# Patient Record
Sex: FEMALE | Race: WHITE | Employment: UNEMPLOYED | URBAN - NONMETROPOLITAN AREA
[De-identification: names, ages, dates, MRNs, and addresses within clinical notes are randomized per-mention and may not be internally consistent; named-entity substitution may affect disease eponyms.]

---

## 2017-02-15 DIAGNOSIS — M16.0 OSTEOARTHRITIS OF BOTH HIPS, UNSPECIFIED OSTEOARTHRITIS TYPE: ICD-10-CM

## 2017-02-16 RX ORDER — CELECOXIB 100 MG/1
100 CAPSULE ORAL 2 TIMES DAILY
Qty: 60 CAPSULE | Refills: 0 | Status: SHIPPED | OUTPATIENT
Start: 2017-02-16 | End: 2017-05-04 | Stop reason: SDUPTHER

## 2017-05-04 ENCOUNTER — TELEPHONE (OUTPATIENT)
Dept: OTHER | Age: 57
End: 2017-05-04

## 2017-05-04 ENCOUNTER — HOSPITAL ENCOUNTER (OUTPATIENT)
Dept: OTHER | Age: 57
Discharge: HOME | End: 2017-05-04
Attending: NURSE PRACTITIONER | Admitting: NURSE PRACTITIONER

## 2017-05-04 ENCOUNTER — OFFICE VISIT (OUTPATIENT)
Dept: OTHER | Age: 57
End: 2017-05-04

## 2017-05-04 VITALS
SYSTOLIC BLOOD PRESSURE: 130 MMHG | WEIGHT: 161 LBS | HEART RATE: 62 BPM | DIASTOLIC BLOOD PRESSURE: 70 MMHG | BODY MASS INDEX: 29.45 KG/M2 | OXYGEN SATURATION: 98 %

## 2017-05-04 DIAGNOSIS — M16.0 OSTEOARTHRITIS OF BOTH HIPS, UNSPECIFIED OSTEOARTHRITIS TYPE: ICD-10-CM

## 2017-05-04 DIAGNOSIS — F99 INSOMNIA DUE TO OTHER MENTAL DISORDER: ICD-10-CM

## 2017-05-04 DIAGNOSIS — F33.9 EPISODE OF RECURRENT MAJOR DEPRESSIVE DISORDER, UNSPECIFIED DEPRESSION EPISODE SEVERITY (HCC): ICD-10-CM

## 2017-05-04 DIAGNOSIS — I10 ESSENTIAL HYPERTENSION: Primary | ICD-10-CM

## 2017-05-04 DIAGNOSIS — F51.05 INSOMNIA DUE TO OTHER MENTAL DISORDER: ICD-10-CM

## 2017-05-04 DIAGNOSIS — M62.830 BACK MUSCLE SPASM: ICD-10-CM

## 2017-05-04 DIAGNOSIS — I10 ESSENTIAL HYPERTENSION: ICD-10-CM

## 2017-05-04 PROCEDURE — 99214 OFFICE O/P EST MOD 30 MIN: CPT | Performed by: NURSE PRACTITIONER

## 2017-05-04 RX ORDER — CARISOPRODOL 350 MG/1
TABLET ORAL
Refills: 4 | COMMUNITY
Start: 2017-04-14 | End: 2017-05-04 | Stop reason: SDUPTHER

## 2017-05-04 RX ORDER — QUETIAPINE FUMARATE 50 MG/1
50 TABLET, FILM COATED ORAL NIGHTLY
Qty: 30 TABLET | Refills: 3 | Status: SHIPPED | OUTPATIENT
Start: 2017-05-04 | End: 2017-08-14 | Stop reason: SDUPTHER

## 2017-05-04 RX ORDER — QUETIAPINE FUMARATE 50 MG/1
50 TABLET, FILM COATED ORAL NIGHTLY
Qty: 30 TABLET | Refills: 3 | Status: SHIPPED | OUTPATIENT
Start: 2017-05-04 | End: 2017-05-04 | Stop reason: SDUPTHER

## 2017-05-04 RX ORDER — CELECOXIB 100 MG/1
100 CAPSULE ORAL 2 TIMES DAILY
Qty: 60 CAPSULE | Refills: 3 | Status: SHIPPED | OUTPATIENT
Start: 2017-05-04 | End: 2017-05-04 | Stop reason: SDUPTHER

## 2017-05-04 RX ORDER — ESCITALOPRAM OXALATE 10 MG/1
10 TABLET ORAL DAILY
Qty: 30 TABLET | Refills: 3 | Status: SHIPPED | OUTPATIENT
Start: 2017-05-04 | End: 2017-08-14 | Stop reason: SDUPTHER

## 2017-05-04 RX ORDER — ESCITALOPRAM OXALATE 10 MG/1
10 TABLET ORAL DAILY
Qty: 30 TABLET | Refills: 3 | Status: SHIPPED | OUTPATIENT
Start: 2017-05-04 | End: 2017-05-04 | Stop reason: SDUPTHER

## 2017-05-04 RX ORDER — HYDROCHLOROTHIAZIDE 25 MG/1
25 TABLET ORAL DAILY
Qty: 30 TABLET | Refills: 3 | Status: SHIPPED | OUTPATIENT
Start: 2017-05-04 | End: 2017-05-04 | Stop reason: SDUPTHER

## 2017-05-04 RX ORDER — CARISOPRODOL 350 MG/1
350 TABLET ORAL 3 TIMES DAILY PRN
Qty: 90 TABLET | Refills: 1 | Status: SHIPPED | OUTPATIENT
Start: 2017-05-04 | End: 2017-06-15 | Stop reason: SDUPTHER

## 2017-05-04 RX ORDER — HYDROCHLOROTHIAZIDE 25 MG/1
25 TABLET ORAL DAILY
Qty: 30 TABLET | Refills: 3 | Status: SHIPPED | OUTPATIENT
Start: 2017-05-04 | End: 2017-08-14 | Stop reason: SDUPTHER

## 2017-05-04 RX ORDER — CELECOXIB 100 MG/1
100 CAPSULE ORAL 2 TIMES DAILY
Qty: 60 CAPSULE | Refills: 3 | Status: SHIPPED | OUTPATIENT
Start: 2017-05-04 | End: 2017-08-14 | Stop reason: SDUPTHER

## 2017-05-04 ASSESSMENT — ENCOUNTER SYMPTOMS
RESPIRATORY NEGATIVE: 1
ALLERGIC/IMMUNOLOGIC NEGATIVE: 1
BACK PAIN: 1
GASTROINTESTINAL NEGATIVE: 1
EYES NEGATIVE: 1

## 2017-06-15 ENCOUNTER — HOSPITAL ENCOUNTER (OUTPATIENT)
Dept: OTHER | Age: 57
Discharge: HOME | End: 2017-06-15
Attending: NURSE PRACTITIONER | Admitting: NURSE PRACTITIONER

## 2017-06-15 ENCOUNTER — OFFICE VISIT (OUTPATIENT)
Dept: OTHER | Age: 57
End: 2017-06-15

## 2017-06-15 ENCOUNTER — TELEPHONE (OUTPATIENT)
Dept: OTHER | Age: 57
End: 2017-06-15

## 2017-06-15 VITALS
OXYGEN SATURATION: 97 % | BODY MASS INDEX: 29.81 KG/M2 | DIASTOLIC BLOOD PRESSURE: 82 MMHG | SYSTOLIC BLOOD PRESSURE: 129 MMHG | WEIGHT: 163 LBS | HEART RATE: 75 BPM

## 2017-06-15 DIAGNOSIS — G56.20 ULNAR NEURITIS, UNSPECIFIED LATERALITY: ICD-10-CM

## 2017-06-15 DIAGNOSIS — M62.830 BACK MUSCLE SPASM: Primary | ICD-10-CM

## 2017-06-15 PROCEDURE — 99212 OFFICE O/P EST SF 10 MIN: CPT | Performed by: NURSE PRACTITIONER

## 2017-06-15 RX ORDER — CARISOPRODOL 350 MG/1
350 TABLET ORAL 3 TIMES DAILY PRN
Qty: 90 TABLET | Refills: 1 | Status: SHIPPED | OUTPATIENT
Start: 2017-06-15 | End: 2017-08-14 | Stop reason: SDUPTHER

## 2017-06-15 RX ORDER — PYRIDOXINE HCL (VITAMIN B6) 25 MG
25 TABLET ORAL DAILY
Qty: 30 TABLET | Refills: 0 | Status: SHIPPED | OUTPATIENT
Start: 2017-06-15 | End: 2017-06-15 | Stop reason: DRUGHIGH

## 2017-06-15 RX ORDER — PYRIDOXINE HCL (VITAMIN B6) 100 MG
100 TABLET ORAL DAILY
Qty: 30 TABLET | Refills: 0 | Status: SHIPPED | OUTPATIENT
Start: 2017-06-15 | End: 2017-08-14 | Stop reason: SDUPTHER

## 2017-06-15 ASSESSMENT — ENCOUNTER SYMPTOMS
GASTROINTESTINAL NEGATIVE: 1
EYES NEGATIVE: 1
BACK PAIN: 1
ALLERGIC/IMMUNOLOGIC NEGATIVE: 1
RESPIRATORY NEGATIVE: 1

## 2017-08-14 DIAGNOSIS — I10 ESSENTIAL HYPERTENSION: ICD-10-CM

## 2017-08-14 DIAGNOSIS — M16.0 OSTEOARTHRITIS OF BOTH HIPS, UNSPECIFIED OSTEOARTHRITIS TYPE: ICD-10-CM

## 2017-08-14 DIAGNOSIS — E78.5 HYPERLIPIDEMIA, UNSPECIFIED HYPERLIPIDEMIA TYPE: ICD-10-CM

## 2017-08-14 DIAGNOSIS — G56.20 ULNAR NEURITIS, UNSPECIFIED LATERALITY: ICD-10-CM

## 2017-08-14 DIAGNOSIS — M62.830 BACK MUSCLE SPASM: ICD-10-CM

## 2017-08-14 DIAGNOSIS — F33.9 EPISODE OF RECURRENT MAJOR DEPRESSIVE DISORDER, UNSPECIFIED DEPRESSION EPISODE SEVERITY (HCC): ICD-10-CM

## 2017-08-14 RX ORDER — SIMVASTATIN 20 MG
20 TABLET ORAL NIGHTLY
Qty: 30 TABLET | Refills: 3 | Status: SHIPPED | OUTPATIENT
Start: 2017-08-14 | End: 2018-01-11 | Stop reason: SDUPTHER

## 2017-08-14 RX ORDER — QUETIAPINE FUMARATE 50 MG/1
50 TABLET, FILM COATED ORAL NIGHTLY
Qty: 30 TABLET | Refills: 3 | Status: SHIPPED | OUTPATIENT
Start: 2017-08-14 | End: 2017-11-17 | Stop reason: SDUPTHER

## 2017-08-14 RX ORDER — ESCITALOPRAM OXALATE 10 MG/1
10 TABLET ORAL DAILY
Qty: 30 TABLET | Refills: 3 | Status: SHIPPED | OUTPATIENT
Start: 2017-08-14 | End: 2018-01-11 | Stop reason: SDUPTHER

## 2017-08-14 RX ORDER — CARISOPRODOL 350 MG/1
350 TABLET ORAL 3 TIMES DAILY PRN
Qty: 90 TABLET | Refills: 1 | Status: SHIPPED | OUTPATIENT
Start: 2017-08-14 | End: 2017-09-19 | Stop reason: SDUPTHER

## 2017-08-14 RX ORDER — HYDROCHLOROTHIAZIDE 25 MG/1
25 TABLET ORAL DAILY
Qty: 30 TABLET | Refills: 3 | Status: SHIPPED | OUTPATIENT
Start: 2017-08-14 | End: 2018-01-11 | Stop reason: SDUPTHER

## 2017-08-14 RX ORDER — PYRIDOXINE HCL (VITAMIN B6) 100 MG
100 TABLET ORAL DAILY
Qty: 30 TABLET | Refills: 0 | Status: SHIPPED | OUTPATIENT
Start: 2017-08-14 | End: 2018-05-14 | Stop reason: SDUPTHER

## 2017-08-14 RX ORDER — CELECOXIB 100 MG/1
100 CAPSULE ORAL 2 TIMES DAILY
Qty: 60 CAPSULE | Refills: 3 | Status: SHIPPED | OUTPATIENT
Start: 2017-08-14 | End: 2017-10-19 | Stop reason: SDUPTHER

## 2017-09-19 ENCOUNTER — OFFICE VISIT (OUTPATIENT)
Dept: INTERNAL MEDICINE CLINIC | Age: 57
End: 2017-09-19

## 2017-09-19 VITALS
OXYGEN SATURATION: 97 % | HEIGHT: 62 IN | BODY MASS INDEX: 29.44 KG/M2 | DIASTOLIC BLOOD PRESSURE: 70 MMHG | HEART RATE: 73 BPM | WEIGHT: 160 LBS | SYSTOLIC BLOOD PRESSURE: 110 MMHG

## 2017-09-19 DIAGNOSIS — Z23 NEEDS FLU SHOT: ICD-10-CM

## 2017-09-19 DIAGNOSIS — Z13.29 SCREENING FOR THYROID DISORDER: ICD-10-CM

## 2017-09-19 DIAGNOSIS — M62.830 BACK MUSCLE SPASM: ICD-10-CM

## 2017-09-19 DIAGNOSIS — E78.5 HYPERLIPIDEMIA, UNSPECIFIED HYPERLIPIDEMIA TYPE: ICD-10-CM

## 2017-09-19 DIAGNOSIS — G89.29 CHRONIC RIGHT SHOULDER PAIN: ICD-10-CM

## 2017-09-19 DIAGNOSIS — Z13.228 SCREENING FOR METABOLIC DISORDER: ICD-10-CM

## 2017-09-19 DIAGNOSIS — Z72.0 TOBACCO ABUSE: ICD-10-CM

## 2017-09-19 DIAGNOSIS — Z13.0 SCREENING, ANEMIA, DEFICIENCY, IRON: ICD-10-CM

## 2017-09-19 DIAGNOSIS — G56.20 ULNAR NEURITIS, UNSPECIFIED LATERALITY: ICD-10-CM

## 2017-09-19 DIAGNOSIS — R20.0 NUMBNESS OF FINGERS OF BOTH HANDS: Primary | ICD-10-CM

## 2017-09-19 DIAGNOSIS — M25.511 CHRONIC RIGHT SHOULDER PAIN: ICD-10-CM

## 2017-09-19 PROCEDURE — 90686 IIV4 VACC NO PRSV 0.5 ML IM: CPT | Performed by: NURSE PRACTITIONER

## 2017-09-19 PROCEDURE — 99214 OFFICE O/P EST MOD 30 MIN: CPT | Performed by: NURSE PRACTITIONER

## 2017-09-19 PROCEDURE — 90471 IMMUNIZATION ADMIN: CPT | Performed by: NURSE PRACTITIONER

## 2017-09-19 RX ORDER — CARISOPRODOL 350 MG/1
350 TABLET ORAL 3 TIMES DAILY PRN
Qty: 90 TABLET | Refills: 1 | Status: SHIPPED | OUTPATIENT
Start: 2017-09-19 | End: 2017-10-19 | Stop reason: SDUPTHER

## 2017-09-19 ASSESSMENT — ENCOUNTER SYMPTOMS
NAUSEA: 1
WHEEZING: 0
COUGH: 0
SHORTNESS OF BREATH: 0
RESPIRATORY NEGATIVE: 1
DIARRHEA: 0
BACK PAIN: 1
VOMITING: 0
RECTAL PAIN: 0

## 2017-10-19 ENCOUNTER — OFFICE VISIT (OUTPATIENT)
Dept: INTERNAL MEDICINE CLINIC | Age: 57
End: 2017-10-19

## 2017-10-19 VITALS
RESPIRATION RATE: 16 BRPM | OXYGEN SATURATION: 98 % | WEIGHT: 161 LBS | SYSTOLIC BLOOD PRESSURE: 104 MMHG | BODY MASS INDEX: 29.63 KG/M2 | HEART RATE: 78 BPM | HEIGHT: 62 IN | DIASTOLIC BLOOD PRESSURE: 72 MMHG

## 2017-10-19 DIAGNOSIS — R20.0 NUMBNESS OF FINGERS OF BOTH HANDS: Primary | ICD-10-CM

## 2017-10-19 DIAGNOSIS — M62.830 BACK MUSCLE SPASM: ICD-10-CM

## 2017-10-19 DIAGNOSIS — M16.0 OSTEOARTHRITIS OF BOTH HIPS, UNSPECIFIED OSTEOARTHRITIS TYPE: ICD-10-CM

## 2017-10-19 DIAGNOSIS — Z13.29 THYROID DISORDER SCREENING: ICD-10-CM

## 2017-10-19 DIAGNOSIS — Z13.220 SCREENING FOR LIPID DISORDERS: ICD-10-CM

## 2017-10-19 DIAGNOSIS — Z13.0 SCREENING, ANEMIA, DEFICIENCY, IRON: ICD-10-CM

## 2017-10-19 DIAGNOSIS — Z13.228 SCREENING FOR METABOLIC DISORDER: ICD-10-CM

## 2017-10-19 DIAGNOSIS — Z13.1 ENCOUNTER FOR SCREENING FOR DIABETES MELLITUS: ICD-10-CM

## 2017-10-19 LAB
A/G RATIO: 1.7 (ref 1.1–2.2)
ALBUMIN SERPL-MCNC: 4.7 G/DL (ref 3.4–5)
ALP BLD-CCNC: 86 U/L (ref 40–129)
ALT SERPL-CCNC: 11 U/L (ref 10–40)
ANION GAP SERPL CALCULATED.3IONS-SCNC: 14 MMOL/L (ref 3–16)
AST SERPL-CCNC: 16 U/L (ref 15–37)
BASOPHILS ABSOLUTE: 0.1 K/UL (ref 0–0.2)
BASOPHILS RELATIVE PERCENT: 1 %
BILIRUB SERPL-MCNC: 0.4 MG/DL (ref 0–1)
BUN BLDV-MCNC: 12 MG/DL (ref 7–20)
CALCIUM SERPL-MCNC: 10.4 MG/DL (ref 8.3–10.6)
CHLORIDE BLD-SCNC: 100 MMOL/L (ref 99–110)
CHOLESTEROL, TOTAL: 190 MG/DL (ref 0–199)
CO2: 27 MMOL/L (ref 21–32)
CREAT SERPL-MCNC: 0.6 MG/DL (ref 0.6–1.1)
EOSINOPHILS ABSOLUTE: 0.2 K/UL (ref 0–0.6)
EOSINOPHILS RELATIVE PERCENT: 2.2 %
GFR AFRICAN AMERICAN: >60
GFR NON-AFRICAN AMERICAN: >60
GLOBULIN: 2.8 G/DL
GLUCOSE BLD-MCNC: 89 MG/DL (ref 70–99)
HBA1C MFR BLD: 5 %
HCT VFR BLD CALC: 47.7 % (ref 36–48)
HDLC SERPL-MCNC: 43 MG/DL (ref 40–60)
HEMOGLOBIN: 16.1 G/DL (ref 12–16)
LDL CHOLESTEROL CALCULATED: 119 MG/DL
LYMPHOCYTES ABSOLUTE: 1.7 K/UL (ref 1–5.1)
LYMPHOCYTES RELATIVE PERCENT: 23.7 %
MCH RBC QN AUTO: 32.9 PG (ref 26–34)
MCHC RBC AUTO-ENTMCNC: 33.7 G/DL (ref 31–36)
MCV RBC AUTO: 97.6 FL (ref 80–100)
MONOCYTES ABSOLUTE: 0.5 K/UL (ref 0–1.3)
MONOCYTES RELATIVE PERCENT: 7 %
NEUTROPHILS ABSOLUTE: 4.8 K/UL (ref 1.7–7.7)
NEUTROPHILS RELATIVE PERCENT: 66.1 %
PDW BLD-RTO: 12.8 % (ref 12.4–15.4)
PLATELET # BLD: 205 K/UL (ref 135–450)
PMV BLD AUTO: 10.4 FL (ref 5–10.5)
POTASSIUM SERPL-SCNC: 5.3 MMOL/L (ref 3.5–5.1)
RBC # BLD: 4.88 M/UL (ref 4–5.2)
SODIUM BLD-SCNC: 141 MMOL/L (ref 136–145)
T4 FREE: 1.3 NG/DL (ref 0.9–1.8)
TOTAL PROTEIN: 7.5 G/DL (ref 6.4–8.2)
TRIGL SERPL-MCNC: 138 MG/DL (ref 0–150)
TSH SERPL DL<=0.05 MIU/L-ACNC: 0.46 UIU/ML (ref 0.27–4.2)
VLDLC SERPL CALC-MCNC: 28 MG/DL
WBC # BLD: 7.3 K/UL (ref 4–11)

## 2017-10-19 PROCEDURE — 83036 HEMOGLOBIN GLYCOSYLATED A1C: CPT | Performed by: NURSE PRACTITIONER

## 2017-10-19 PROCEDURE — 96127 BRIEF EMOTIONAL/BEHAV ASSMT: CPT | Performed by: NURSE PRACTITIONER

## 2017-10-19 PROCEDURE — 36415 COLL VENOUS BLD VENIPUNCTURE: CPT | Performed by: NURSE PRACTITIONER

## 2017-10-19 PROCEDURE — 99214 OFFICE O/P EST MOD 30 MIN: CPT | Performed by: NURSE PRACTITIONER

## 2017-10-19 RX ORDER — CARISOPRODOL 350 MG/1
350 TABLET ORAL 3 TIMES DAILY PRN
Qty: 90 TABLET | Refills: 1 | Status: SHIPPED | OUTPATIENT
Start: 2017-10-19 | End: 2017-12-12 | Stop reason: SDUPTHER

## 2017-10-19 RX ORDER — CELECOXIB 100 MG/1
100 CAPSULE ORAL 2 TIMES DAILY
Qty: 60 CAPSULE | Refills: 3 | Status: SHIPPED | OUTPATIENT
Start: 2017-10-19 | End: 2018-01-11 | Stop reason: SDUPTHER

## 2017-10-19 ASSESSMENT — PATIENT HEALTH QUESTIONNAIRE - PHQ9
8. MOVING OR SPEAKING SO SLOWLY THAT OTHER PEOPLE COULD HAVE NOTICED. OR THE OPPOSITE, BEING SO FIGETY OR RESTLESS THAT YOU HAVE BEEN MOVING AROUND A LOT MORE THAN USUAL: 0
3. TROUBLE FALLING OR STAYING ASLEEP: 1
9. THOUGHTS THAT YOU WOULD BE BETTER OFF DEAD, OR OF HURTING YOURSELF: 0
2. FEELING DOWN, DEPRESSED OR HOPELESS: 3
1. LITTLE INTEREST OR PLEASURE IN DOING THINGS: 1
4. FEELING TIRED OR HAVING LITTLE ENERGY: 0
SUM OF ALL RESPONSES TO PHQ QUESTIONS 1-9: 6
SUM OF ALL RESPONSES TO PHQ9 QUESTIONS 1 & 2: 4
6. FEELING BAD ABOUT YOURSELF - OR THAT YOU ARE A FAILURE OR HAVE LET YOURSELF OR YOUR FAMILY DOWN: 0
7. TROUBLE CONCENTRATING ON THINGS, SUCH AS READING THE NEWSPAPER OR WATCHING TELEVISION: 0
10. IF YOU CHECKED OFF ANY PROBLEMS, HOW DIFFICULT HAVE THESE PROBLEMS MADE IT FOR YOU TO DO YOUR WORK, TAKE CARE OF THINGS AT HOME, OR GET ALONG WITH OTHER PEOPLE: 1
5. POOR APPETITE OR OVEREATING: 1

## 2017-10-19 ASSESSMENT — ENCOUNTER SYMPTOMS: BACK PAIN: 1

## 2017-10-19 NOTE — PROGRESS NOTES
Darling Mckinnon is a 65 y.o. female who presents today with   Chief Complaint   Patient presents with    Numbness     Numbness of fingers and hands    Arm Pain     Pain in Right arm    Back Pain    Arthritis       /72 (Site: Left Arm, Position: Sitting, Cuff Size: Small Adult)   Pulse 78   Resp 16   Ht 5' 2\" (1.575 m)   Wt 161 lb (73 kg)   SpO2 98%   BMI 29.45 kg/m²      SUBJECTIVE:    HPI     Elizabeth Liu is a pleasant 65year old female who presents today for the reasons noted above in the Chief Complaint. She endorses worsening numbness in her hands and right arm, R>L. She also endorses continued back muscle spasms, back pain and insomnia. She denies any other somatic complaints.     Past Medical History:   Diagnosis Date    Allergic rhinitis     flowers    Anxiety     Arthritis     left hip    Chronic back pain     Depression     Essential hypertension 7/8/2016    Fall     \"fell coming in the door at home on 12/11/2015- went to ER- put brace on and sent me home then 12/13/2015 fell again- rehurt my left knee- for surgery\"( 12/17/2015\")    Hyperlipidemia 10/28/2016    Osteoarthritis     Restless legs syndrome     Sciatic pain     Tobacco abuse      Past Surgical History:   Procedure Laterality Date    EYE SURGERY Left     At age 15\"for lazy eye\"    FRACTURE SURGERY  bilat wrist fx as a child    \"fell off a swing set- broke both wrists\"    PATELLA SURGERY Left 12/17/15    ORIF patella     TUBAL LIGATION  1985     Family History   Problem Relation Age of Onset    Cancer Mother      lung ca    High Blood Pressure Mother     Stroke Father     COPD Father     Heart Disease Father     Kidney Disease Brother     Diabetes Brother     Glaucoma Brother     Diabetes Brother     High Blood Pressure Sister     Migraines Daughter     Diabetes Brother     Mental Retardation Brother      Social History   Substance Use Topics    Smoking status: Current Every Day Smoker     Packs/day: 0.50 Results   Component Value Date    WBC 8.6 06/03/2016    HGB 16.1 06/03/2016    HCT 47.3 06/03/2016    MCV 90.0 06/03/2016     06/03/2016    SEGSABS 5.9 06/03/2016    LYMPHSABS 1.8 06/03/2016    MONOSABS 0.6 06/03/2016    EOSABS 0.2 06/03/2016    BASOSABS 0.1 06/03/2016     Lab Results   Component Value Date    TSHHS 0.833 06/03/2016       Controlled Substances Monitoring:  Done 9/19/2017    ASSESSMENT AND PLAN:     1. Numbness of fingers of both hands  · Consider cervical vertebrae defect, musculoskeletal strain, ulnar entrapment  · [facility]  · XR Cervical Spine 2 or 3 VW; Future    2. Back muscle spasm  · Controlled  · Continue carisoprodol (SOMA) 350 MG tablet; Take 1 tablet by mouth 3 times daily as needed for Muscle spasms  Dispense: 90 tablet; Refill: 1    3. Osteoarthritis of both hips, unspecified osteoarthritis type  · Controlled  · Continue celecoxib (CELEBREX) 100 MG capsule; Take 1 capsule by mouth 2 times daily  Dispense: 60 capsule; Refill: 3    4. Screening, anemia, deficiency, iron  · CBC Auto Differential    5. Thyroid disorder screening  · TSH without Reflex  · T4, Free    6. Encounter for screening for diabetes mellitus  · POCT glycosylated hemoglobin (Hb A1C)    7. Screening for lipid disorders  · Lipid Panel    8.  Screening for metabolic disorder  · Comprehensive Metabolic Panel    Follow up: in 1 month for hand numbness, review lab results

## 2017-11-17 ENCOUNTER — OFFICE VISIT (OUTPATIENT)
Dept: INTERNAL MEDICINE CLINIC | Age: 57
End: 2017-11-17

## 2017-11-17 VITALS
RESPIRATION RATE: 14 BRPM | HEIGHT: 62 IN | HEART RATE: 62 BPM | WEIGHT: 163 LBS | SYSTOLIC BLOOD PRESSURE: 125 MMHG | BODY MASS INDEX: 30 KG/M2 | DIASTOLIC BLOOD PRESSURE: 86 MMHG | OXYGEN SATURATION: 98 %

## 2017-11-17 DIAGNOSIS — R20.0 NUMBNESS OF FINGERS OF BOTH HANDS: ICD-10-CM

## 2017-11-17 DIAGNOSIS — I10 ESSENTIAL HYPERTENSION: Primary | ICD-10-CM

## 2017-11-17 DIAGNOSIS — J30.89 CHRONIC NONSEASONAL ALLERGIC RHINITIS DUE TO OTHER ALLERGEN: ICD-10-CM

## 2017-11-17 DIAGNOSIS — Z72.0 TOBACCO ABUSE: ICD-10-CM

## 2017-11-17 DIAGNOSIS — F33.9 EPISODE OF RECURRENT MAJOR DEPRESSIVE DISORDER, UNSPECIFIED DEPRESSION EPISODE SEVERITY (HCC): ICD-10-CM

## 2017-11-17 DIAGNOSIS — E78.5 HYPERLIPIDEMIA, UNSPECIFIED HYPERLIPIDEMIA TYPE: ICD-10-CM

## 2017-11-17 PROBLEM — J30.9 ALLERGIC RHINITIS DUE TO ALLERGEN: Status: ACTIVE | Noted: 2017-11-17

## 2017-11-17 PROCEDURE — G8419 CALC BMI OUT NRM PARAM NOF/U: HCPCS | Performed by: NURSE PRACTITIONER

## 2017-11-17 PROCEDURE — G8427 DOCREV CUR MEDS BY ELIG CLIN: HCPCS | Performed by: NURSE PRACTITIONER

## 2017-11-17 PROCEDURE — G8484 FLU IMMUNIZE NO ADMIN: HCPCS | Performed by: NURSE PRACTITIONER

## 2017-11-17 PROCEDURE — 3014F SCREEN MAMMO DOC REV: CPT | Performed by: NURSE PRACTITIONER

## 2017-11-17 PROCEDURE — 4004F PT TOBACCO SCREEN RCVD TLK: CPT | Performed by: NURSE PRACTITIONER

## 2017-11-17 PROCEDURE — 99214 OFFICE O/P EST MOD 30 MIN: CPT | Performed by: NURSE PRACTITIONER

## 2017-11-17 PROCEDURE — 3017F COLORECTAL CA SCREEN DOC REV: CPT | Performed by: NURSE PRACTITIONER

## 2017-11-17 RX ORDER — QUETIAPINE FUMARATE 100 MG/1
100 TABLET, FILM COATED ORAL NIGHTLY
Qty: 30 TABLET | Refills: 1 | Status: SHIPPED | OUTPATIENT
Start: 2017-11-17 | End: 2018-01-11 | Stop reason: SDUPTHER

## 2017-11-17 RX ORDER — AZELASTINE 1 MG/ML
2 SPRAY, METERED NASAL 2 TIMES DAILY
Qty: 1 BOTTLE | Refills: 3 | Status: SHIPPED | OUTPATIENT
Start: 2017-11-17 | End: 2018-09-04 | Stop reason: SDUPTHER

## 2017-11-17 ASSESSMENT — ENCOUNTER SYMPTOMS
RHINORRHEA: 1
BACK PAIN: 1
NAUSEA: 0
SHORTNESS OF BREATH: 0
WHEEZING: 0
VOMITING: 0
DIARRHEA: 0
COUGH: 1
SORE THROAT: 0

## 2017-11-17 NOTE — PROGRESS NOTES
Neurological: Positive for numbness and headaches. Negative for dizziness, weakness and light-headedness. Numbness in both 5th fingers, intense itching above right 5th fingernail    Isolated headache   Psychiatric/Behavioral: Positive for sleep disturbance. Negative for dysphoric mood and suicidal ideas. The patient is nervous/anxious. Anxiety 7-8/10  Depression 1/10       OBJECTIVE:      Physical Exam   Constitutional: She is oriented to person, place, and time. Vital signs are normal. She appears well-developed and well-nourished. Lab and pertinent imaging results reviewed   HENT:   Head: Normocephalic. Cardiovascular: Normal rate, regular rhythm, S1 normal, S2 normal and normal heart sounds. No extrasystoles are present. Exam reveals no gallop, no S3, no S4 and no friction rub. No murmur heard. Pulses:       Radial pulses are 2+ on the right side, and 2+ on the left side. Pulmonary/Chest: Effort normal and breath sounds normal.   Abdominal: Soft. Musculoskeletal: Normal range of motion. Neurological: She is alert and oriented to person, place, and time. She has normal strength. No cranial nerve deficit or sensory deficit. She displays a negative Romberg sign. Coordination and gait normal. GCS eye subscore is 4. GCS verbal subscore is 5. GCS motor subscore is 6. Reflex Scores:       Tricep reflexes are 2+ on the right side and 2+ on the left side. Bicep reflexes are 2+ on the right side and 2+ on the left side. Brachioradialis reflexes are 2+ on the right side and 2+ on the left side. Patellar reflexes are 2+ on the right side and 2+ on the left side. Achilles reflexes are 2+ on the right side and 2+ on the left side. Skin: Skin is warm, dry and intact. Psychiatric: She has a normal mood and affect. Her speech is normal and behavior is normal.   Vitals reviewed. Results for Hai Fragoso (MRN [id number]) as of 1/20/2018 12:14   Ref.  Range 7/26/2016 Dispense: 30 tablet; Refill: 1  · Continue escitalopram    6. Chronic nonseasonal allergic rhinitis due to other allergen  · Controlled  · Continue azelastine (ASTELIN) 0.1 % nasal spray; 2 sprays by Nasal route 2 times daily Use in each nostril as directed  Dispense: 1 Bottle;  Refill: 3    Follow up: in 1 month

## 2017-12-12 DIAGNOSIS — M62.830 BACK MUSCLE SPASM: ICD-10-CM

## 2017-12-12 RX ORDER — CARISOPRODOL 350 MG/1
350 TABLET ORAL 3 TIMES DAILY PRN
Qty: 90 TABLET | Refills: 1 | Status: SHIPPED | OUTPATIENT
Start: 2017-12-12 | End: 2018-01-11 | Stop reason: SDUPTHER

## 2017-12-12 NOTE — TELEPHONE ENCOUNTER
Patient had and appt today but she was unable to make the appt due to the weather. She does need this refill. Please advise.

## 2018-01-11 ENCOUNTER — OFFICE VISIT (OUTPATIENT)
Dept: INTERNAL MEDICINE CLINIC | Age: 58
End: 2018-01-11

## 2018-01-11 VITALS
BODY MASS INDEX: 30.73 KG/M2 | RESPIRATION RATE: 14 BRPM | WEIGHT: 167 LBS | SYSTOLIC BLOOD PRESSURE: 120 MMHG | OXYGEN SATURATION: 98 % | DIASTOLIC BLOOD PRESSURE: 80 MMHG | HEIGHT: 62 IN | HEART RATE: 72 BPM

## 2018-01-11 DIAGNOSIS — M62.830 BACK MUSCLE SPASM: ICD-10-CM

## 2018-01-11 DIAGNOSIS — Z72.0 TOBACCO ABUSE: ICD-10-CM

## 2018-01-11 DIAGNOSIS — Z12.11 SCREENING FOR COLON CANCER: ICD-10-CM

## 2018-01-11 DIAGNOSIS — E78.5 HYPERLIPIDEMIA, UNSPECIFIED HYPERLIPIDEMIA TYPE: ICD-10-CM

## 2018-01-11 DIAGNOSIS — M16.0 OSTEOARTHRITIS OF BOTH HIPS, UNSPECIFIED OSTEOARTHRITIS TYPE: ICD-10-CM

## 2018-01-11 DIAGNOSIS — I10 ESSENTIAL HYPERTENSION: Primary | ICD-10-CM

## 2018-01-11 DIAGNOSIS — F33.9 EPISODE OF RECURRENT MAJOR DEPRESSIVE DISORDER, UNSPECIFIED DEPRESSION EPISODE SEVERITY (HCC): ICD-10-CM

## 2018-01-11 PROBLEM — G25.81 RESTLESS LEGS SYNDROME: Status: ACTIVE | Noted: 2018-01-11

## 2018-01-11 PROBLEM — M19.90 ARTHRITIS: Status: RESOLVED | Noted: 2018-01-11 | Resolved: 2018-01-11
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
## 2018-01-11 PROCEDURE — 99214 OFFICE O/P EST MOD 30 MIN: CPT | Performed by: NURSE PRACTITIONER

## 2018-01-11 PROCEDURE — G8417 CALC BMI ABV UP PARAM F/U: HCPCS | Performed by: NURSE PRACTITIONER

## 2018-01-11 PROCEDURE — 3014F SCREEN MAMMO DOC REV: CPT | Performed by: NURSE PRACTITIONER

## 2018-01-11 PROCEDURE — G8427 DOCREV CUR MEDS BY ELIG CLIN: HCPCS | Performed by: NURSE PRACTITIONER

## 2018-01-11 PROCEDURE — 3017F COLORECTAL CA SCREEN DOC REV: CPT | Performed by: NURSE PRACTITIONER

## 2018-01-11 PROCEDURE — G8484 FLU IMMUNIZE NO ADMIN: HCPCS | Performed by: NURSE PRACTITIONER

## 2018-01-11 PROCEDURE — 4004F PT TOBACCO SCREEN RCVD TLK: CPT | Performed by: NURSE PRACTITIONER

## 2018-01-11 RX ORDER — CELECOXIB 100 MG/1
100 CAPSULE ORAL 2 TIMES DAILY
Qty: 60 CAPSULE | Refills: 3 | Status: SHIPPED | OUTPATIENT
Start: 2018-01-11 | End: 2018-04-13 | Stop reason: SDUPTHER

## 2018-01-11 RX ORDER — SIMVASTATIN 20 MG
20 TABLET ORAL NIGHTLY
Qty: 30 TABLET | Refills: 3 | Status: SHIPPED | OUTPATIENT
Start: 2018-01-11 | End: 2018-04-13 | Stop reason: SDUPTHER

## 2018-01-11 RX ORDER — HYDROCHLOROTHIAZIDE 25 MG/1
25 TABLET ORAL DAILY
Qty: 30 TABLET | Refills: 3 | Status: SHIPPED | OUTPATIENT
Start: 2018-01-11 | End: 2018-04-13 | Stop reason: SDUPTHER

## 2018-01-11 RX ORDER — QUETIAPINE FUMARATE 100 MG/1
100 TABLET, FILM COATED ORAL NIGHTLY
Qty: 30 TABLET | Refills: 3 | Status: SHIPPED | OUTPATIENT
Start: 2018-01-11 | End: 2018-04-13 | Stop reason: SDUPTHER

## 2018-01-11 RX ORDER — CARISOPRODOL 350 MG/1
350 TABLET ORAL 3 TIMES DAILY PRN
Qty: 90 TABLET | Refills: 1 | Status: SHIPPED | OUTPATIENT
Start: 2018-01-11 | End: 2018-02-21 | Stop reason: SDUPTHER

## 2018-01-11 RX ORDER — ESCITALOPRAM OXALATE 10 MG/1
10 TABLET ORAL DAILY
Qty: 30 TABLET | Refills: 3 | Status: SHIPPED | OUTPATIENT
Start: 2018-01-11 | End: 2018-04-13 | Stop reason: SDUPTHER

## 2018-02-20 DIAGNOSIS — M62.830 BACK MUSCLE SPASM: ICD-10-CM

## 2018-02-20 RX ORDER — CARISOPRODOL 350 MG/1
350 TABLET ORAL 3 TIMES DAILY PRN
Qty: 90 TABLET | Refills: 1 | Status: CANCELLED | OUTPATIENT
Start: 2018-02-20 | End: 2018-03-22

## 2018-02-21 ENCOUNTER — TELEPHONE (OUTPATIENT)
Dept: INTERNAL MEDICINE CLINIC | Age: 58
End: 2018-02-21

## 2018-02-21 DIAGNOSIS — M62.830 BACK MUSCLE SPASM: ICD-10-CM

## 2018-02-21 RX ORDER — CARISOPRODOL 350 MG/1
350 TABLET ORAL 3 TIMES DAILY PRN
Qty: 90 TABLET | Refills: 0 | Status: SHIPPED | OUTPATIENT
Start: 2018-02-21 | End: 2018-02-22

## 2018-02-22 RX ORDER — CARISOPRODOL 350 MG/1
350 TABLET ORAL 3 TIMES DAILY PRN
Qty: 90 TABLET | Refills: 0 | Status: SHIPPED | OUTPATIENT
Start: 2018-02-22 | End: 2018-03-14 | Stop reason: SDUPTHER

## 2018-02-22 NOTE — TELEPHONE ENCOUNTER
[facility] but [facility] be sent to [facility].

## 2018-02-22 NOTE — TELEPHONE ENCOUNTER
Sent to wrong location, will resend to [facility] to be sent to [facility].

## 2018-03-14 ENCOUNTER — OFFICE VISIT (OUTPATIENT)
Dept: INTERNAL MEDICINE CLINIC | Age: 58
End: 2018-03-14

## 2018-03-14 VITALS
DIASTOLIC BLOOD PRESSURE: 80 MMHG | RESPIRATION RATE: 16 BRPM | SYSTOLIC BLOOD PRESSURE: 110 MMHG | HEART RATE: 82 BPM | HEIGHT: 62 IN | WEIGHT: 171 LBS | BODY MASS INDEX: 31.47 KG/M2 | OXYGEN SATURATION: 98 %

## 2018-03-14 DIAGNOSIS — Z13.31 POSITIVE DEPRESSION SCREENING: Primary | ICD-10-CM

## 2018-03-14 DIAGNOSIS — Z23 NEED FOR SHINGLES VACCINE: ICD-10-CM

## 2018-03-14 DIAGNOSIS — M62.830 BACK MUSCLE SPASM: ICD-10-CM

## 2018-03-14 DIAGNOSIS — Z12.11 COLON CANCER SCREENING: ICD-10-CM

## 2018-03-14 DIAGNOSIS — F33.1 MODERATE EPISODE OF RECURRENT MAJOR DEPRESSIVE DISORDER (HCC): ICD-10-CM

## 2018-03-14 PROCEDURE — G8427 DOCREV CUR MEDS BY ELIG CLIN: HCPCS | Performed by: NURSE PRACTITIONER

## 2018-03-14 PROCEDURE — G8417 CALC BMI ABV UP PARAM F/U: HCPCS | Performed by: NURSE PRACTITIONER

## 2018-03-14 PROCEDURE — G8482 FLU IMMUNIZE ORDER/ADMIN: HCPCS | Performed by: NURSE PRACTITIONER

## 2018-03-14 PROCEDURE — 4004F PT TOBACCO SCREEN RCVD TLK: CPT | Performed by: NURSE PRACTITIONER

## 2018-03-14 PROCEDURE — 99214 OFFICE O/P EST MOD 30 MIN: CPT | Performed by: NURSE PRACTITIONER

## 2018-03-14 PROCEDURE — 3017F COLORECTAL CA SCREEN DOC REV: CPT | Performed by: NURSE PRACTITIONER

## 2018-03-14 PROCEDURE — 3014F SCREEN MAMMO DOC REV: CPT | Performed by: NURSE PRACTITIONER

## 2018-03-14 PROCEDURE — G8431 POS CLIN DEPRES SCRN F/U DOC: HCPCS | Performed by: NURSE PRACTITIONER

## 2018-03-14 RX ORDER — CARISOPRODOL 350 MG/1
350 TABLET ORAL 3 TIMES DAILY PRN
Qty: 90 TABLET | Refills: 0 | Status: SHIPPED | OUTPATIENT
Start: 2018-03-14 | End: 2018-09-04 | Stop reason: SDUPTHER

## 2018-03-14 NOTE — PROGRESS NOTES
depression screening  · Positive Screen for Clinical Depression with a Documented Follow-up Plan     2. Moderate episode of recurrent major depressive disorder (HCC)  · Continue Lexapro    3. Back muscle spasm  · Continue carisoprodol (SOMA) 350 MG tablet; Take 1 tablet by mouth 3 times daily as needed for Muscle spasms for up to 30 days. Dispense: 90 tablet; Refill: 0    4. Need for shingles vaccine  · zoster vaccine live, PF, (ZOSTAVAX) 57835 UNT/0.65ML injection; Inject 0.65 mLs into the skin once for 1 dose  Dispense: 0.65 mL; Refill: 0    5. Colon cancer screening  · POCT Fecal Immunochemical Test (FIT); Future    Follow-up: In one month    On the basis of positive PHQ-9 screening ( ), the following plan was implemented: medication prescribed: Lexapro- 10 mg- patient will call for any significant medication side effects or worsening symptoms of depression. Patient will follow-up in 1 month(s) with PCP.

## 2018-04-13 ENCOUNTER — OFFICE VISIT (OUTPATIENT)
Dept: INTERNAL MEDICINE CLINIC | Age: 58
End: 2018-04-13

## 2018-04-13 VITALS
BODY MASS INDEX: 31.83 KG/M2 | HEART RATE: 97 BPM | WEIGHT: 173 LBS | HEIGHT: 62 IN | SYSTOLIC BLOOD PRESSURE: 110 MMHG | RESPIRATION RATE: 20 BRPM | OXYGEN SATURATION: 98 % | DIASTOLIC BLOOD PRESSURE: 82 MMHG

## 2018-04-13 DIAGNOSIS — M54.42 CHRONIC BILATERAL LOW BACK PAIN WITH BILATERAL SCIATICA: ICD-10-CM

## 2018-04-13 DIAGNOSIS — Z13.820 ENCOUNTER FOR SCREENING FOR OSTEOPOROSIS: ICD-10-CM

## 2018-04-13 DIAGNOSIS — M54.32 BILATERAL SCIATICA: ICD-10-CM

## 2018-04-13 DIAGNOSIS — I10 ESSENTIAL HYPERTENSION: ICD-10-CM

## 2018-04-13 DIAGNOSIS — F33.9 EPISODE OF RECURRENT MAJOR DEPRESSIVE DISORDER, UNSPECIFIED DEPRESSION EPISODE SEVERITY (HCC): ICD-10-CM

## 2018-04-13 DIAGNOSIS — E78.5 HYPERLIPIDEMIA, UNSPECIFIED HYPERLIPIDEMIA TYPE: ICD-10-CM

## 2018-04-13 DIAGNOSIS — G89.29 CHRONIC BILATERAL LOW BACK PAIN WITH BILATERAL SCIATICA: ICD-10-CM

## 2018-04-13 DIAGNOSIS — M54.41 CHRONIC BILATERAL LOW BACK PAIN WITH BILATERAL SCIATICA: ICD-10-CM

## 2018-04-13 DIAGNOSIS — M54.31 BILATERAL SCIATICA: ICD-10-CM

## 2018-04-13 DIAGNOSIS — M25.559 CHRONIC HIP PAIN, UNSPECIFIED LATERALITY: ICD-10-CM

## 2018-04-13 DIAGNOSIS — M16.0 OSTEOARTHRITIS OF BOTH HIPS, UNSPECIFIED OSTEOARTHRITIS TYPE: ICD-10-CM

## 2018-04-13 DIAGNOSIS — Z72.0 TOBACCO ABUSE: Primary | ICD-10-CM

## 2018-04-13 DIAGNOSIS — G89.29 CHRONIC HIP PAIN, UNSPECIFIED LATERALITY: ICD-10-CM

## 2018-04-13 PROCEDURE — 3014F SCREEN MAMMO DOC REV: CPT | Performed by: NURSE PRACTITIONER

## 2018-04-13 PROCEDURE — G8427 DOCREV CUR MEDS BY ELIG CLIN: HCPCS | Performed by: NURSE PRACTITIONER

## 2018-04-13 PROCEDURE — 3017F COLORECTAL CA SCREEN DOC REV: CPT | Performed by: NURSE PRACTITIONER

## 2018-04-13 PROCEDURE — 99215 OFFICE O/P EST HI 40 MIN: CPT | Performed by: NURSE PRACTITIONER

## 2018-04-13 PROCEDURE — G8417 CALC BMI ABV UP PARAM F/U: HCPCS | Performed by: NURSE PRACTITIONER

## 2018-04-13 PROCEDURE — 4004F PT TOBACCO SCREEN RCVD TLK: CPT | Performed by: NURSE PRACTITIONER

## 2018-04-13 RX ORDER — CELECOXIB 100 MG/1
100 CAPSULE ORAL 2 TIMES DAILY
Qty: 60 CAPSULE | Refills: 3 | Status: SHIPPED | OUTPATIENT
Start: 2018-04-13 | End: 2018-05-14 | Stop reason: SDUPTHER

## 2018-04-13 RX ORDER — HYDROCODONE BITARTRATE AND ACETAMINOPHEN 5; 325 MG/1; MG/1
1 TABLET ORAL EVERY 8 HOURS PRN
Qty: 21 TABLET | Refills: 0 | Status: SHIPPED | OUTPATIENT
Start: 2018-04-13 | End: 2018-04-13 | Stop reason: SDUPTHER

## 2018-04-13 RX ORDER — VARENICLINE TARTRATE 1 MG/1
1 TABLET, FILM COATED ORAL 2 TIMES DAILY
Qty: 60 TABLET | Refills: 1 | Status: SHIPPED | OUTPATIENT
Start: 2018-04-13 | End: 2018-04-13 | Stop reason: SDUPTHER

## 2018-04-13 RX ORDER — QUETIAPINE FUMARATE 100 MG/1
100 TABLET, FILM COATED ORAL NIGHTLY
Qty: 30 TABLET | Refills: 3 | Status: SHIPPED | OUTPATIENT
Start: 2018-04-13 | End: 2018-04-13 | Stop reason: SDUPTHER

## 2018-04-13 RX ORDER — CELECOXIB 100 MG/1
100 CAPSULE ORAL 2 TIMES DAILY
Qty: 60 CAPSULE | Refills: 3 | Status: SHIPPED | OUTPATIENT
Start: 2018-04-13 | End: 2018-04-13 | Stop reason: SDUPTHER

## 2018-04-13 RX ORDER — VARENICLINE TARTRATE 25 MG
KIT ORAL
Qty: 1 EACH | Refills: 0 | Status: SHIPPED | OUTPATIENT
Start: 2018-04-13 | End: 2018-08-03 | Stop reason: ALTCHOICE

## 2018-04-13 RX ORDER — ESCITALOPRAM OXALATE 10 MG/1
10 TABLET ORAL DAILY
Qty: 30 TABLET | Refills: 3 | Status: SHIPPED | OUTPATIENT
Start: 2018-04-13 | End: 2018-04-13 | Stop reason: SDUPTHER

## 2018-04-13 RX ORDER — VARENICLINE TARTRATE 25 MG
KIT ORAL
Qty: 1 EACH | Refills: 0 | Status: SHIPPED | OUTPATIENT
Start: 2018-04-13 | End: 2018-04-13 | Stop reason: SDUPTHER

## 2018-04-13 RX ORDER — SIMVASTATIN 20 MG
20 TABLET ORAL NIGHTLY
Qty: 30 TABLET | Refills: 3 | Status: SHIPPED | OUTPATIENT
Start: 2018-04-13 | End: 2018-05-14 | Stop reason: SDUPTHER

## 2018-04-13 RX ORDER — PREDNISONE 10 MG/1
TABLET ORAL
Qty: 39 TABLET | Refills: 0 | Status: SHIPPED | OUTPATIENT
Start: 2018-04-13 | End: 2018-04-13 | Stop reason: SDUPTHER

## 2018-04-13 RX ORDER — VARENICLINE TARTRATE 1 MG/1
1 TABLET, FILM COATED ORAL 2 TIMES DAILY
Qty: 60 TABLET | Refills: 1 | Status: SHIPPED | OUTPATIENT
Start: 2018-04-13 | End: 2018-08-03 | Stop reason: ALTCHOICE

## 2018-04-13 RX ORDER — HYDROCHLOROTHIAZIDE 25 MG/1
25 TABLET ORAL DAILY
Qty: 30 TABLET | Refills: 3 | Status: SHIPPED | OUTPATIENT
Start: 2018-04-13 | End: 2018-04-13 | Stop reason: SDUPTHER

## 2018-04-13 RX ORDER — SIMVASTATIN 20 MG
20 TABLET ORAL NIGHTLY
Qty: 30 TABLET | Refills: 3 | Status: SHIPPED | OUTPATIENT
Start: 2018-04-13 | End: 2018-04-13 | Stop reason: SDUPTHER

## 2018-04-13 RX ORDER — HYDROCHLOROTHIAZIDE 25 MG/1
25 TABLET ORAL DAILY
Qty: 30 TABLET | Refills: 3 | Status: SHIPPED | OUTPATIENT
Start: 2018-04-13 | End: 2018-05-14 | Stop reason: SDUPTHER

## 2018-04-13 RX ORDER — PREDNISONE 10 MG/1
TABLET ORAL
Qty: 39 TABLET | Refills: 0 | Status: SHIPPED | OUTPATIENT
Start: 2018-04-13 | End: 2018-08-03 | Stop reason: ALTCHOICE

## 2018-04-13 RX ORDER — CARISOPRODOL 350 MG/1
350 TABLET ORAL 3 TIMES DAILY PRN
Qty: 90 TABLET | Refills: 0 | Status: CANCELLED | OUTPATIENT
Start: 2018-04-13 | End: 2018-05-13

## 2018-04-13 RX ORDER — HYDROCODONE BITARTRATE AND ACETAMINOPHEN 5; 325 MG/1; MG/1
1 TABLET ORAL EVERY 8 HOURS PRN
Qty: 21 TABLET | Refills: 0 | Status: SHIPPED | OUTPATIENT
Start: 2018-04-13 | End: 2018-04-20

## 2018-04-13 RX ORDER — QUETIAPINE FUMARATE 100 MG/1
100 TABLET, FILM COATED ORAL NIGHTLY
Qty: 30 TABLET | Refills: 3 | Status: SHIPPED | OUTPATIENT
Start: 2018-04-13 | End: 2018-05-14 | Stop reason: SDUPTHER

## 2018-04-13 RX ORDER — AZELASTINE 1 MG/ML
2 SPRAY, METERED NASAL 2 TIMES DAILY
Qty: 1 BOTTLE | Refills: 3 | Status: CANCELLED | OUTPATIENT
Start: 2018-04-13

## 2018-04-13 RX ORDER — ESCITALOPRAM OXALATE 10 MG/1
10 TABLET ORAL DAILY
Qty: 30 TABLET | Refills: 3 | Status: SHIPPED | OUTPATIENT
Start: 2018-04-13 | End: 2018-05-14 | Stop reason: SDUPTHER

## 2018-04-13 RX ORDER — PYRIDOXINE HCL (VITAMIN B6) 100 MG
100 TABLET ORAL DAILY
Qty: 30 TABLET | Refills: 0 | Status: CANCELLED | OUTPATIENT
Start: 2018-04-13

## 2018-04-16 ENCOUNTER — HOSPITAL ENCOUNTER (OUTPATIENT)
Dept: GENERAL RADIOLOGY | Age: 58
Discharge: HOME | End: 2018-04-16
Attending: NURSE PRACTITIONER | Admitting: NURSE PRACTITIONER

## 2018-04-16 DIAGNOSIS — M25.551 PAIN IN JOINT INVOLVING RIGHT PELVIC REGION AND THIGH: ICD-10-CM

## 2018-04-16 DIAGNOSIS — M25.552 PAIN IN JOINT INVOLVING LEFT PELVIC REGION AND THIGH: ICD-10-CM

## 2018-04-16 DIAGNOSIS — M54.42 ACUTE BACK PAIN WITH SCIATICA, LEFT: ICD-10-CM

## 2018-04-25 ENCOUNTER — HOSPITAL ENCOUNTER (OUTPATIENT)
Dept: MAMMOGRAPHY | Age: 58
Discharge: HOME | End: 2018-04-25
Attending: NURSE PRACTITIONER | Admitting: NURSE PRACTITIONER

## 2018-04-25 DIAGNOSIS — Z13.820 ENCOUNTER FOR IMAGING TO ASSESS OSTEOPOROSIS: ICD-10-CM

## 2018-04-25 DIAGNOSIS — Z13.820 ENCOUNTER FOR SCREENING FOR OSTEOPOROSIS: ICD-10-CM

## 2018-05-14 ENCOUNTER — OFFICE VISIT (OUTPATIENT)
Dept: INTERNAL MEDICINE CLINIC | Age: 58
End: 2018-05-14

## 2018-05-14 VITALS
WEIGHT: 172.6 LBS | SYSTOLIC BLOOD PRESSURE: 141 MMHG | OXYGEN SATURATION: 98 % | HEIGHT: 62 IN | BODY MASS INDEX: 31.76 KG/M2 | RESPIRATION RATE: 20 BRPM | DIASTOLIC BLOOD PRESSURE: 90 MMHG | HEART RATE: 63 BPM

## 2018-05-14 DIAGNOSIS — I10 ESSENTIAL HYPERTENSION: ICD-10-CM

## 2018-05-14 DIAGNOSIS — F33.9 EPISODE OF RECURRENT MAJOR DEPRESSIVE DISORDER, UNSPECIFIED DEPRESSION EPISODE SEVERITY (HCC): ICD-10-CM

## 2018-05-14 DIAGNOSIS — M62.830 BACK MUSCLE SPASM: ICD-10-CM

## 2018-05-14 DIAGNOSIS — E78.5 HYPERLIPIDEMIA, UNSPECIFIED HYPERLIPIDEMIA TYPE: ICD-10-CM

## 2018-05-14 DIAGNOSIS — G56.20 ULNAR NEURITIS, UNSPECIFIED LATERALITY: ICD-10-CM

## 2018-05-14 DIAGNOSIS — Z72.0 TOBACCO ABUSE: Primary | ICD-10-CM

## 2018-05-14 DIAGNOSIS — M16.0 OSTEOARTHRITIS OF BOTH HIPS, UNSPECIFIED OSTEOARTHRITIS TYPE: ICD-10-CM

## 2018-05-14 DIAGNOSIS — M25.551 BILATERAL HIP PAIN: ICD-10-CM

## 2018-05-14 DIAGNOSIS — M25.552 BILATERAL HIP PAIN: ICD-10-CM

## 2018-05-14 PROBLEM — G89.29 CHRONIC RIGHT SHOULDER PAIN: Status: RESOLVED | Noted: 2017-09-19 | Resolved: 2018-05-14

## 2018-05-14 PROBLEM — M25.511 CHRONIC RIGHT SHOULDER PAIN: Status: RESOLVED | Noted: 2017-09-19 | Resolved: 2018-05-14

## 2018-05-14 PROBLEM — R20.0 NUMBNESS OF FINGERS OF BOTH HANDS: Status: RESOLVED | Noted: 2017-09-19 | Resolved: 2018-05-14

## 2018-05-14 PROCEDURE — 4004F PT TOBACCO SCREEN RCVD TLK: CPT | Performed by: NURSE PRACTITIONER

## 2018-05-14 PROCEDURE — 3017F COLORECTAL CA SCREEN DOC REV: CPT | Performed by: NURSE PRACTITIONER

## 2018-05-14 PROCEDURE — G8427 DOCREV CUR MEDS BY ELIG CLIN: HCPCS | Performed by: NURSE PRACTITIONER

## 2018-05-14 PROCEDURE — 3014F SCREEN MAMMO DOC REV: CPT | Performed by: NURSE PRACTITIONER

## 2018-05-14 PROCEDURE — G8417 CALC BMI ABV UP PARAM F/U: HCPCS | Performed by: NURSE PRACTITIONER

## 2018-05-14 PROCEDURE — 99214 OFFICE O/P EST MOD 30 MIN: CPT | Performed by: NURSE PRACTITIONER

## 2018-05-14 RX ORDER — HYDROCHLOROTHIAZIDE 25 MG/1
25 TABLET ORAL DAILY
Qty: 30 TABLET | Refills: 3 | Status: SHIPPED | OUTPATIENT
Start: 2018-05-14 | End: 2018-08-03 | Stop reason: SDUPTHER

## 2018-05-14 RX ORDER — ESCITALOPRAM OXALATE 10 MG/1
10 TABLET ORAL DAILY
Qty: 30 TABLET | Refills: 3 | Status: SHIPPED | OUTPATIENT
Start: 2018-05-14 | End: 2018-08-03 | Stop reason: SDUPTHER

## 2018-05-14 RX ORDER — PYRIDOXINE HCL (VITAMIN B6) 100 MG
100 TABLET ORAL DAILY
Qty: 30 TABLET | Refills: 0 | Status: SHIPPED | OUTPATIENT
Start: 2018-05-14 | End: 2018-08-03 | Stop reason: ALTCHOICE

## 2018-05-14 RX ORDER — CARISOPRODOL 350 MG/1
350 TABLET ORAL 3 TIMES DAILY PRN
Qty: 90 TABLET | Refills: 0 | Status: CANCELLED | OUTPATIENT
Start: 2018-05-14 | End: 2018-06-13

## 2018-05-14 RX ORDER — CARISOPRODOL 350 MG/1
350 TABLET ORAL 4 TIMES DAILY PRN
COMMUNITY
End: 2018-07-03 | Stop reason: SDUPTHER

## 2018-05-14 RX ORDER — QUETIAPINE FUMARATE 100 MG/1
100 TABLET, FILM COATED ORAL NIGHTLY
Qty: 30 TABLET | Refills: 3 | Status: SHIPPED | OUTPATIENT
Start: 2018-05-14 | End: 2018-08-03 | Stop reason: SDUPTHER

## 2018-05-14 RX ORDER — SIMVASTATIN 20 MG
20 TABLET ORAL NIGHTLY
Qty: 30 TABLET | Refills: 3 | Status: SHIPPED | OUTPATIENT
Start: 2018-05-14 | End: 2018-08-03 | Stop reason: SDUPTHER

## 2018-05-14 RX ORDER — CELECOXIB 100 MG/1
100 CAPSULE ORAL 2 TIMES DAILY
Qty: 60 CAPSULE | Refills: 3 | Status: SHIPPED | OUTPATIENT
Start: 2018-05-14 | End: 2018-08-03 | Stop reason: SDUPTHER

## 2018-05-15 LAB
AMPHETAMINE SCREEN, URINE: ABNORMAL
BARBITURATE SCREEN URINE: ABNORMAL
BENZODIAZEPINE SCREEN, URINE: ABNORMAL
CANNABINOID SCREEN URINE: POSITIVE
COCAINE METABOLITE SCREEN URINE: ABNORMAL
Lab: ABNORMAL
METHADONE SCREEN, URINE: ABNORMAL
OPIATE SCREEN URINE: ABNORMAL
OXYCODONE URINE: ABNORMAL
PH UA: 5
PHENCYCLIDINE SCREEN URINE: ABNORMAL
PROPOXYPHENE SCREEN: ABNORMAL

## 2018-05-16 RX ORDER — CARISOPRODOL 350 MG/1
350 TABLET ORAL 4 TIMES DAILY PRN
Qty: 120 TABLET | Refills: 0 | Status: CANCELLED | OUTPATIENT
Start: 2018-05-16 | End: 2018-06-15

## 2018-06-28 ENCOUNTER — HOSPITAL ENCOUNTER (OUTPATIENT)
Dept: LAB | Age: 58
Discharge: HOME | End: 2018-06-28
Attending: NURSE PRACTITIONER | Admitting: NURSE PRACTITIONER

## 2018-06-28 LAB
ANION GAP SERPL CALCULATED.3IONS-SCNC: 13 MMOL/L (ref 4–16)
BUN BLDV-MCNC: 9 MG/DL (ref 6–23)
CALCIUM SERPL-MCNC: 9.9 MG/DL (ref 8.3–10.6)
CHLORIDE BLD-SCNC: 100 MMOL/L (ref 99–110)
CO2: 27 MMOL/L (ref 21–32)
CREAT SERPL-MCNC: 0.8 MG/DL (ref 0.6–1.1)
GFR AFRICAN AMERICAN: >60 ML/MIN/1.73M2
GFR NON-AFRICAN AMERICAN: >60 ML/MIN/1.73M2
GLUCOSE FASTING: 82 MG/DL (ref 70–99)
POTASSIUM SERPL-SCNC: 4.2 MMOL/L (ref 3.5–5.1)
SODIUM BLD-SCNC: 140 MMOL/L (ref 135–145)

## 2018-07-03 ENCOUNTER — OFFICE VISIT (OUTPATIENT)
Dept: INTERNAL MEDICINE CLINIC | Age: 58
End: 2018-07-03

## 2018-07-03 VITALS
DIASTOLIC BLOOD PRESSURE: 76 MMHG | HEIGHT: 64 IN | SYSTOLIC BLOOD PRESSURE: 128 MMHG | OXYGEN SATURATION: 99 % | BODY MASS INDEX: 29.71 KG/M2 | WEIGHT: 174 LBS | HEART RATE: 68 BPM

## 2018-07-03 DIAGNOSIS — G25.81 RESTLESS LEG SYNDROME: ICD-10-CM

## 2018-07-03 DIAGNOSIS — Z12.4 SCREENING FOR CERVICAL CANCER: ICD-10-CM

## 2018-07-03 DIAGNOSIS — I10 ESSENTIAL HYPERTENSION: Primary | ICD-10-CM

## 2018-07-03 DIAGNOSIS — G89.29 CHRONIC BILATERAL LOW BACK PAIN WITH LEFT-SIDED SCIATICA: ICD-10-CM

## 2018-07-03 DIAGNOSIS — M62.830 BACK MUSCLE SPASM: ICD-10-CM

## 2018-07-03 DIAGNOSIS — E78.5 HYPERLIPIDEMIA, UNSPECIFIED HYPERLIPIDEMIA TYPE: ICD-10-CM

## 2018-07-03 DIAGNOSIS — M54.42 CHRONIC BILATERAL LOW BACK PAIN WITH LEFT-SIDED SCIATICA: ICD-10-CM

## 2018-07-03 PROCEDURE — G8417 CALC BMI ABV UP PARAM F/U: HCPCS | Performed by: NURSE PRACTITIONER

## 2018-07-03 PROCEDURE — 3014F SCREEN MAMMO DOC REV: CPT | Performed by: NURSE PRACTITIONER

## 2018-07-03 PROCEDURE — 3017F COLORECTAL CA SCREEN DOC REV: CPT | Performed by: NURSE PRACTITIONER

## 2018-07-03 PROCEDURE — 99214 OFFICE O/P EST MOD 30 MIN: CPT | Performed by: NURSE PRACTITIONER

## 2018-07-03 PROCEDURE — 4004F PT TOBACCO SCREEN RCVD TLK: CPT | Performed by: NURSE PRACTITIONER

## 2018-07-03 PROCEDURE — G8427 DOCREV CUR MEDS BY ELIG CLIN: HCPCS | Performed by: NURSE PRACTITIONER

## 2018-07-03 RX ORDER — ROPINIROLE 0.25 MG/1
0.25 TABLET, FILM COATED ORAL 3 TIMES DAILY
Qty: 90 TABLET | Refills: 0 | Status: SHIPPED | OUTPATIENT
Start: 2018-07-03 | End: 2018-07-05 | Stop reason: SINTOL

## 2018-07-03 RX ORDER — CARISOPRODOL 350 MG/1
350 TABLET ORAL 3 TIMES DAILY PRN
Qty: 90 TABLET | Refills: 0 | Status: SHIPPED | OUTPATIENT
Start: 2018-07-03 | End: 2018-08-03 | Stop reason: SDUPTHER

## 2018-07-05 ENCOUNTER — TELEPHONE (OUTPATIENT)
Dept: INTERNAL MEDICINE CLINIC | Age: 58
End: 2018-07-05

## 2018-07-05 DIAGNOSIS — G25.81 RESTLESS LEGS SYNDROME: Primary | ICD-10-CM

## 2018-07-05 RX ORDER — PRAMIPEXOLE DIHYDROCHLORIDE 0.12 MG/1
0.12 TABLET ORAL NIGHTLY
Qty: 30 TABLET | Refills: 0 | Status: SHIPPED | OUTPATIENT
Start: 2018-07-05 | End: 2018-08-03 | Stop reason: SDUPTHER

## 2018-07-05 NOTE — TELEPHONE ENCOUNTER
Patient called and stated since she has taken the medication she was prescribed for RLS. ( Requip 0.25 mg) Patient stated that she has been having diarrhea, nausea, vomiting, dizziness, weakness, so patient D/C that medication and wants to try something else with fewer complications. Patient uses [facility] in [place]. Please be advised.

## 2018-08-03 ENCOUNTER — OFFICE VISIT (OUTPATIENT)
Dept: INTERNAL MEDICINE CLINIC | Age: 58
End: 2018-08-03

## 2018-08-03 VITALS
OXYGEN SATURATION: 100 % | HEART RATE: 65 BPM | SYSTOLIC BLOOD PRESSURE: 124 MMHG | DIASTOLIC BLOOD PRESSURE: 78 MMHG | BODY MASS INDEX: 29.7 KG/M2 | WEIGHT: 173 LBS

## 2018-08-03 DIAGNOSIS — I10 ESSENTIAL HYPERTENSION: ICD-10-CM

## 2018-08-03 DIAGNOSIS — M16.0 OSTEOARTHRITIS OF BOTH HIPS, UNSPECIFIED OSTEOARTHRITIS TYPE: ICD-10-CM

## 2018-08-03 DIAGNOSIS — G25.81 RESTLESS LEGS SYNDROME: ICD-10-CM

## 2018-08-03 DIAGNOSIS — M62.830 BACK MUSCLE SPASM: ICD-10-CM

## 2018-08-03 DIAGNOSIS — E78.5 HYPERLIPIDEMIA, UNSPECIFIED HYPERLIPIDEMIA TYPE: ICD-10-CM

## 2018-08-03 DIAGNOSIS — F33.9 EPISODE OF RECURRENT MAJOR DEPRESSIVE DISORDER, UNSPECIFIED DEPRESSION EPISODE SEVERITY (HCC): ICD-10-CM

## 2018-08-03 PROCEDURE — 4004F PT TOBACCO SCREEN RCVD TLK: CPT | Performed by: NURSE PRACTITIONER

## 2018-08-03 PROCEDURE — 3017F COLORECTAL CA SCREEN DOC REV: CPT | Performed by: NURSE PRACTITIONER

## 2018-08-03 PROCEDURE — G8417 CALC BMI ABV UP PARAM F/U: HCPCS | Performed by: NURSE PRACTITIONER

## 2018-08-03 PROCEDURE — 99214 OFFICE O/P EST MOD 30 MIN: CPT | Performed by: NURSE PRACTITIONER

## 2018-08-03 PROCEDURE — 3014F SCREEN MAMMO DOC REV: CPT | Performed by: NURSE PRACTITIONER

## 2018-08-03 PROCEDURE — G8427 DOCREV CUR MEDS BY ELIG CLIN: HCPCS | Performed by: NURSE PRACTITIONER

## 2018-08-03 RX ORDER — CELECOXIB 100 MG/1
100 CAPSULE ORAL 2 TIMES DAILY
Qty: 60 CAPSULE | Refills: 3 | Status: SHIPPED | OUTPATIENT
Start: 2018-08-03 | End: 2018-09-04 | Stop reason: SDUPTHER

## 2018-08-03 RX ORDER — CARISOPRODOL 350 MG/1
350 TABLET ORAL 3 TIMES DAILY PRN
Qty: 90 TABLET | Refills: 0 | Status: SHIPPED | OUTPATIENT
Start: 2018-08-03 | End: 2018-08-06

## 2018-08-03 RX ORDER — HYDROCHLOROTHIAZIDE 25 MG/1
25 TABLET ORAL DAILY
Qty: 30 TABLET | Refills: 3 | Status: SHIPPED | OUTPATIENT
Start: 2018-08-03 | End: 2018-09-04 | Stop reason: SDUPTHER

## 2018-08-03 RX ORDER — QUETIAPINE FUMARATE 100 MG/1
100 TABLET, FILM COATED ORAL NIGHTLY
Qty: 30 TABLET | Refills: 3 | Status: SHIPPED | OUTPATIENT
Start: 2018-08-03 | End: 2018-09-04 | Stop reason: SDUPTHER

## 2018-08-03 RX ORDER — ESCITALOPRAM OXALATE 10 MG/1
10 TABLET ORAL DAILY
Qty: 30 TABLET | Refills: 3 | Status: SHIPPED | OUTPATIENT
Start: 2018-08-03 | End: 2018-09-04 | Stop reason: SDUPTHER

## 2018-08-03 RX ORDER — SIMVASTATIN 20 MG
20 TABLET ORAL NIGHTLY
Qty: 30 TABLET | Refills: 3 | Status: SHIPPED | OUTPATIENT
Start: 2018-08-03 | End: 2018-09-04 | Stop reason: SDUPTHER

## 2018-08-03 RX ORDER — PRAMIPEXOLE DIHYDROCHLORIDE 0.12 MG/1
0.12 TABLET ORAL NIGHTLY
Qty: 30 TABLET | Refills: 3 | Status: SHIPPED | OUTPATIENT
Start: 2018-08-03 | End: 2018-12-05 | Stop reason: SDUPTHER

## 2018-08-06 ENCOUNTER — TELEPHONE (OUTPATIENT)
Dept: INTERNAL MEDICINE CLINIC | Age: 58
End: 2018-08-06

## 2018-08-06 DIAGNOSIS — M62.830 BACK MUSCLE SPASM: Primary | ICD-10-CM

## 2018-08-06 RX ORDER — CYCLOBENZAPRINE HCL 10 MG
10 TABLET ORAL 3 TIMES DAILY PRN
Qty: 90 TABLET | Refills: 1 | Status: SHIPPED | OUTPATIENT
Start: 2018-08-06 | End: 2018-08-16

## 2018-08-06 NOTE — TELEPHONE ENCOUNTER
1. Back muscle spasm  · Discontinue SOMA - denied by insurance  · Start cyclobenzaprine (FLEXERIL) 10 MG tablet; Take 1 tablet by mouth 3 times daily as needed for Muscle spasms  Dispense: 90 tablet;  Refill: 1

## 2018-08-06 NOTE — TELEPHONE ENCOUNTER
Called patient to notify her, she states she pays for these out of pocket. I will contact the pharmacy and d.c flexeril.

## 2018-09-04 ENCOUNTER — OFFICE VISIT (OUTPATIENT)
Dept: INTERNAL MEDICINE CLINIC | Age: 58
End: 2018-09-04

## 2018-09-04 VITALS
HEART RATE: 66 BPM | OXYGEN SATURATION: 98 % | HEIGHT: 64 IN | WEIGHT: 175 LBS | BODY MASS INDEX: 29.88 KG/M2 | DIASTOLIC BLOOD PRESSURE: 72 MMHG | SYSTOLIC BLOOD PRESSURE: 108 MMHG

## 2018-09-04 DIAGNOSIS — M62.830 BACK MUSCLE SPASM: ICD-10-CM

## 2018-09-04 DIAGNOSIS — F33.9 EPISODE OF RECURRENT MAJOR DEPRESSIVE DISORDER, UNSPECIFIED DEPRESSION EPISODE SEVERITY (HCC): ICD-10-CM

## 2018-09-04 DIAGNOSIS — E78.5 HYPERLIPIDEMIA, UNSPECIFIED HYPERLIPIDEMIA TYPE: ICD-10-CM

## 2018-09-04 DIAGNOSIS — M16.0 OSTEOARTHRITIS OF BOTH HIPS, UNSPECIFIED OSTEOARTHRITIS TYPE: ICD-10-CM

## 2018-09-04 DIAGNOSIS — I10 ESSENTIAL HYPERTENSION: Primary | ICD-10-CM

## 2018-09-04 PROCEDURE — 3014F SCREEN MAMMO DOC REV: CPT | Performed by: NURSE PRACTITIONER

## 2018-09-04 PROCEDURE — G8427 DOCREV CUR MEDS BY ELIG CLIN: HCPCS | Performed by: NURSE PRACTITIONER

## 2018-09-04 PROCEDURE — 3017F COLORECTAL CA SCREEN DOC REV: CPT | Performed by: NURSE PRACTITIONER

## 2018-09-04 PROCEDURE — 99214 OFFICE O/P EST MOD 30 MIN: CPT | Performed by: NURSE PRACTITIONER

## 2018-09-04 PROCEDURE — G8417 CALC BMI ABV UP PARAM F/U: HCPCS | Performed by: NURSE PRACTITIONER

## 2018-09-04 PROCEDURE — 4004F PT TOBACCO SCREEN RCVD TLK: CPT | Performed by: NURSE PRACTITIONER

## 2018-09-04 RX ORDER — ESCITALOPRAM OXALATE 10 MG/1
10 TABLET ORAL DAILY
Qty: 30 TABLET | Refills: 3 | Status: SHIPPED | OUTPATIENT
Start: 2018-09-04 | End: 2019-01-02 | Stop reason: SDUPTHER

## 2018-09-04 RX ORDER — SIMVASTATIN 20 MG
20 TABLET ORAL NIGHTLY
Qty: 30 TABLET | Refills: 3 | Status: SHIPPED | OUTPATIENT
Start: 2018-09-04 | End: 2019-01-02 | Stop reason: SDUPTHER

## 2018-09-04 RX ORDER — AZELASTINE 1 MG/ML
2 SPRAY, METERED NASAL 2 TIMES DAILY
Qty: 1 BOTTLE | Refills: 3 | Status: SHIPPED | OUTPATIENT
Start: 2018-09-04 | End: 2019-06-28

## 2018-09-04 RX ORDER — CARISOPRODOL 350 MG/1
350 TABLET ORAL 3 TIMES DAILY PRN
Qty: 90 TABLET | Refills: 0 | Status: SHIPPED | OUTPATIENT
Start: 2018-09-04 | End: 2018-10-04 | Stop reason: SDUPTHER

## 2018-09-04 RX ORDER — HYDROCHLOROTHIAZIDE 25 MG/1
25 TABLET ORAL DAILY
Qty: 30 TABLET | Refills: 3 | Status: SHIPPED | OUTPATIENT
Start: 2018-09-04 | End: 2019-01-02 | Stop reason: SDUPTHER

## 2018-09-04 RX ORDER — CELECOXIB 100 MG/1
100 CAPSULE ORAL 2 TIMES DAILY
Qty: 60 CAPSULE | Refills: 3 | Status: SHIPPED | OUTPATIENT
Start: 2018-09-04 | End: 2019-01-02 | Stop reason: SDUPTHER

## 2018-09-04 RX ORDER — QUETIAPINE FUMARATE 100 MG/1
100 TABLET, FILM COATED ORAL NIGHTLY
Qty: 30 TABLET | Refills: 3 | Status: SHIPPED | OUTPATIENT
Start: 2018-09-04 | End: 2019-01-02 | Stop reason: SDUPTHER

## 2018-10-04 ENCOUNTER — OFFICE VISIT (OUTPATIENT)
Dept: INTERNAL MEDICINE | Age: 58
End: 2018-10-04
Payer: MEDICAID

## 2018-10-04 VITALS
SYSTOLIC BLOOD PRESSURE: 110 MMHG | BODY MASS INDEX: 30.21 KG/M2 | DIASTOLIC BLOOD PRESSURE: 70 MMHG | OXYGEN SATURATION: 98 % | WEIGHT: 176 LBS | HEART RATE: 58 BPM

## 2018-10-04 DIAGNOSIS — E78.5 HYPERLIPIDEMIA, UNSPECIFIED HYPERLIPIDEMIA TYPE: ICD-10-CM

## 2018-10-04 DIAGNOSIS — Z00.00 HEALTH CARE MAINTENANCE: ICD-10-CM

## 2018-10-04 DIAGNOSIS — M62.830 BACK MUSCLE SPASM: ICD-10-CM

## 2018-10-04 DIAGNOSIS — I10 ESSENTIAL HYPERTENSION: Primary | ICD-10-CM

## 2018-10-04 PROCEDURE — 99214 OFFICE O/P EST MOD 30 MIN: CPT | Performed by: NURSE PRACTITIONER

## 2018-10-04 RX ORDER — CARISOPRODOL 350 MG/1
350 TABLET ORAL 3 TIMES DAILY PRN
Qty: 90 TABLET | Refills: 1 | Status: SHIPPED | OUTPATIENT
Start: 2018-10-04 | End: 2018-12-04 | Stop reason: SDUPTHER

## 2018-10-04 ASSESSMENT — PATIENT HEALTH QUESTIONNAIRE - PHQ9
2. FEELING DOWN, DEPRESSED OR HOPELESS: 0
7. TROUBLE CONCENTRATING ON THINGS, SUCH AS READING THE NEWSPAPER OR WATCHING TELEVISION: 0
8. MOVING OR SPEAKING SO SLOWLY THAT OTHER PEOPLE COULD HAVE NOTICED. OR THE OPPOSITE, BEING SO FIGETY OR RESTLESS THAT YOU HAVE BEEN MOVING AROUND A LOT MORE THAN USUAL: 0
SUM OF ALL RESPONSES TO PHQ QUESTIONS 1-9: 0
SUM OF ALL RESPONSES TO PHQ QUESTIONS 1-9: 0
4. FEELING TIRED OR HAVING LITTLE ENERGY: 0
3. TROUBLE FALLING OR STAYING ASLEEP: 0
9. THOUGHTS THAT YOU WOULD BE BETTER OFF DEAD, OR OF HURTING YOURSELF: 0
5. POOR APPETITE OR OVEREATING: 0
1. LITTLE INTEREST OR PLEASURE IN DOING THINGS: 0
SUM OF ALL RESPONSES TO PHQ9 QUESTIONS 1 & 2: 0
10. IF YOU CHECKED OFF ANY PROBLEMS, HOW DIFFICULT HAVE THESE PROBLEMS MADE IT FOR YOU TO DO YOUR WORK, TAKE CARE OF THINGS AT HOME, OR GET ALONG WITH OTHER PEOPLE: 0
6. FEELING BAD ABOUT YOURSELF - OR THAT YOU ARE A FAILURE OR HAVE LET YOURSELF OR YOUR FAMILY DOWN: 0

## 2018-12-04 ENCOUNTER — OFFICE VISIT (OUTPATIENT)
Dept: INTERNAL MEDICINE CLINIC | Age: 58
End: 2018-12-04
Payer: MEDICAID

## 2018-12-04 VITALS
HEIGHT: 62 IN | SYSTOLIC BLOOD PRESSURE: 115 MMHG | DIASTOLIC BLOOD PRESSURE: 70 MMHG | WEIGHT: 182 LBS | OXYGEN SATURATION: 98 % | RESPIRATION RATE: 14 BRPM | HEART RATE: 64 BPM | BODY MASS INDEX: 33.49 KG/M2

## 2018-12-04 DIAGNOSIS — Z23 NEEDS FLU SHOT: ICD-10-CM

## 2018-12-04 DIAGNOSIS — Z00.00 HEALTH CARE MAINTENANCE: ICD-10-CM

## 2018-12-04 DIAGNOSIS — M62.830 BACK MUSCLE SPASM: Primary | ICD-10-CM

## 2018-12-04 DIAGNOSIS — G25.81 RESTLESS LEGS SYNDROME: ICD-10-CM

## 2018-12-04 PROCEDURE — G8427 DOCREV CUR MEDS BY ELIG CLIN: HCPCS | Performed by: NURSE PRACTITIONER

## 2018-12-04 PROCEDURE — 90686 IIV4 VACC NO PRSV 0.5 ML IM: CPT | Performed by: NURSE PRACTITIONER

## 2018-12-04 PROCEDURE — 3014F SCREEN MAMMO DOC REV: CPT | Performed by: NURSE PRACTITIONER

## 2018-12-04 PROCEDURE — 4004F PT TOBACCO SCREEN RCVD TLK: CPT | Performed by: NURSE PRACTITIONER

## 2018-12-04 PROCEDURE — 99213 OFFICE O/P EST LOW 20 MIN: CPT | Performed by: NURSE PRACTITIONER

## 2018-12-04 PROCEDURE — 3017F COLORECTAL CA SCREEN DOC REV: CPT | Performed by: NURSE PRACTITIONER

## 2018-12-04 PROCEDURE — G8482 FLU IMMUNIZE ORDER/ADMIN: HCPCS | Performed by: NURSE PRACTITIONER

## 2018-12-04 PROCEDURE — G8417 CALC BMI ABV UP PARAM F/U: HCPCS | Performed by: NURSE PRACTITIONER

## 2018-12-04 PROCEDURE — 90471 IMMUNIZATION ADMIN: CPT | Performed by: NURSE PRACTITIONER

## 2018-12-04 RX ORDER — CARISOPRODOL 350 MG/1
350 TABLET ORAL 3 TIMES DAILY PRN
Qty: 90 TABLET | Refills: 2 | Status: SHIPPED | OUTPATIENT
Start: 2018-12-04 | End: 2019-01-03

## 2018-12-05 DIAGNOSIS — G25.81 RESTLESS LEGS SYNDROME: ICD-10-CM

## 2018-12-05 RX ORDER — PRAMIPEXOLE DIHYDROCHLORIDE 0.12 MG/1
0.12 TABLET ORAL NIGHTLY
Qty: 30 TABLET | Refills: 3 | Status: SHIPPED | OUTPATIENT
Start: 2018-12-05 | End: 2019-01-02 | Stop reason: SDUPTHER

## 2018-12-07 RX ORDER — PRAMIPEXOLE DIHYDROCHLORIDE 0.12 MG/1
0.12 TABLET ORAL NIGHTLY
Qty: 30 TABLET | Refills: 3 | OUTPATIENT
Start: 2018-12-07

## 2019-01-02 DIAGNOSIS — F33.9 EPISODE OF RECURRENT MAJOR DEPRESSIVE DISORDER, UNSPECIFIED DEPRESSION EPISODE SEVERITY (HCC): ICD-10-CM

## 2019-01-02 DIAGNOSIS — E78.5 HYPERLIPIDEMIA, UNSPECIFIED HYPERLIPIDEMIA TYPE: ICD-10-CM

## 2019-01-02 DIAGNOSIS — M16.0 OSTEOARTHRITIS OF BOTH HIPS, UNSPECIFIED OSTEOARTHRITIS TYPE: ICD-10-CM

## 2019-01-02 DIAGNOSIS — I10 ESSENTIAL HYPERTENSION: ICD-10-CM

## 2019-01-02 DIAGNOSIS — G25.81 RESTLESS LEGS SYNDROME: ICD-10-CM

## 2019-01-02 PROBLEM — E78.00 PURE HYPERCHOLESTEROLEMIA: Status: ACTIVE | Noted: 2019-01-02

## 2019-01-02 PROBLEM — M85.88 OSTEOPENIA OF LUMBAR SPINE: Status: ACTIVE | Noted: 2019-01-02

## 2019-01-02 PROBLEM — F12.90 MARIJUANA USE: Status: ACTIVE | Noted: 2019-01-02

## 2019-01-02 RX ORDER — QUETIAPINE FUMARATE 100 MG/1
100 TABLET, FILM COATED ORAL NIGHTLY
Qty: 30 TABLET | Refills: 3 | Status: SHIPPED | OUTPATIENT
Start: 2019-01-02

## 2019-01-02 RX ORDER — SIMVASTATIN 20 MG
20 TABLET ORAL NIGHTLY
Qty: 30 TABLET | Refills: 3 | Status: SHIPPED | OUTPATIENT
Start: 2019-01-02

## 2019-01-02 RX ORDER — PRAMIPEXOLE DIHYDROCHLORIDE 0.12 MG/1
0.12 TABLET ORAL NIGHTLY
Qty: 30 TABLET | Refills: 3 | Status: SHIPPED | OUTPATIENT
Start: 2019-01-02

## 2019-01-02 RX ORDER — CELECOXIB 100 MG/1
100 CAPSULE ORAL 2 TIMES DAILY
Qty: 60 CAPSULE | Refills: 3 | Status: SHIPPED | OUTPATIENT
Start: 2019-01-02

## 2019-01-02 RX ORDER — HYDROCHLOROTHIAZIDE 25 MG/1
25 TABLET ORAL DAILY
Qty: 30 TABLET | Refills: 3 | Status: SHIPPED | OUTPATIENT
Start: 2019-01-02

## 2019-01-02 RX ORDER — ESCITALOPRAM OXALATE 10 MG/1
10 TABLET ORAL DAILY
Qty: 30 TABLET | Refills: 3 | Status: SHIPPED | OUTPATIENT
Start: 2019-01-02 | End: 2019-07-11

## 2019-05-29 ENCOUNTER — HOSPITAL ENCOUNTER (OUTPATIENT)
Dept: MAMMOGRAPHY | Age: 59
Discharge: HOME | End: 2019-05-29
Payer: MEDICAID

## 2019-05-29 DIAGNOSIS — Z12.31 VISIT FOR SCREENING MAMMOGRAM: ICD-10-CM

## 2019-05-29 PROCEDURE — 77067 SCR MAMMO BI INCL CAD: CPT

## 2019-06-28 ENCOUNTER — APPOINTMENT (OUTPATIENT)
Dept: GENERAL RADIOLOGY | Age: 59
End: 2019-06-28
Payer: MEDICAID

## 2019-06-28 ENCOUNTER — HOSPITAL ENCOUNTER (EMERGENCY)
Age: 59
Discharge: HOME | End: 2019-06-28
Attending: EMERGENCY MEDICINE
Payer: MEDICAID

## 2019-06-28 VITALS
DIASTOLIC BLOOD PRESSURE: 85 MMHG | RESPIRATION RATE: 16 BRPM | HEART RATE: 78 BPM | WEIGHT: 180 LBS | BODY MASS INDEX: 33.13 KG/M2 | OXYGEN SATURATION: 100 % | TEMPERATURE: 98.5 F | SYSTOLIC BLOOD PRESSURE: 148 MMHG | HEIGHT: 62 IN

## 2019-06-28 DIAGNOSIS — T07.XXXA ABRASIONS OF MULTIPLE SITES: ICD-10-CM

## 2019-06-28 DIAGNOSIS — S62.101A CLOSED FRACTURE OF RIGHT WRIST, INITIAL ENCOUNTER: Primary | ICD-10-CM

## 2019-06-28 PROCEDURE — 73110 X-RAY EXAM OF WRIST: CPT

## 2019-06-28 PROCEDURE — 4500000028 HC INTERMEDIATE PROCEDURE

## 2019-06-28 PROCEDURE — 73130 X-RAY EXAM OF HAND: CPT

## 2019-06-28 PROCEDURE — 99283 EMERGENCY DEPT VISIT LOW MDM: CPT

## 2019-06-28 PROCEDURE — 96374 THER/PROPH/DIAG INJ IV PUSH: CPT

## 2019-06-28 PROCEDURE — 73080 X-RAY EXAM OF ELBOW: CPT

## 2019-06-28 PROCEDURE — 6360000002 HC RX W HCPCS: Performed by: EMERGENCY MEDICINE

## 2019-06-28 PROCEDURE — 73090 X-RAY EXAM OF FOREARM: CPT

## 2019-06-28 RX ORDER — CARISOPRODOL 350 MG/1
350 TABLET ORAL 4 TIMES DAILY PRN
COMMUNITY

## 2019-06-28 RX ORDER — MORPHINE SULFATE 4 MG/ML
4 INJECTION, SOLUTION INTRAMUSCULAR; INTRAVENOUS ONCE
Status: COMPLETED | OUTPATIENT
Start: 2019-06-28 | End: 2019-06-28

## 2019-06-28 RX ORDER — HYDROCODONE BITARTRATE AND ACETAMINOPHEN 5; 325 MG/1; MG/1
1 TABLET ORAL EVERY 6 HOURS PRN
Qty: 10 TABLET | Refills: 0 | Status: SHIPPED | OUTPATIENT
Start: 2019-06-28 | End: 2019-07-01

## 2019-06-28 RX ADMIN — MORPHINE SULFATE 4 MG: 4 INJECTION INTRAVENOUS at 10:06

## 2019-06-28 ASSESSMENT — PAIN DESCRIPTION - ORIENTATION: ORIENTATION: RIGHT

## 2019-06-28 ASSESSMENT — PAIN DESCRIPTION - FREQUENCY: FREQUENCY: CONTINUOUS

## 2019-06-28 ASSESSMENT — PAIN DESCRIPTION - PROGRESSION: CLINICAL_PROGRESSION: GRADUALLY WORSENING

## 2019-06-28 ASSESSMENT — PAIN SCALES - GENERAL
PAINLEVEL_OUTOF10: 10
PAINLEVEL_OUTOF10: 10

## 2019-06-28 ASSESSMENT — PAIN DESCRIPTION - DESCRIPTORS: DESCRIPTORS: CONSTANT;SHARP

## 2019-06-28 ASSESSMENT — PAIN DESCRIPTION - ONSET: ONSET: SUDDEN

## 2019-06-28 ASSESSMENT — PAIN DESCRIPTION - LOCATION: LOCATION: ARM;WRIST

## 2019-06-28 ASSESSMENT — PAIN DESCRIPTION - PAIN TYPE: TYPE: ACUTE PAIN

## 2019-06-28 NOTE — ED PROVIDER NOTES
Emergency Department Encounter    Patient: Gerson Pineda  MRN: [id number]  : 1960  Date of Evaluation: 2019  ED Provider:  Xavier Quintana    Triage Chief Complaint:   Fall (Pt arrives ambulatory with  stating at home today was walking up step and felll landing on right wrist, deformity noted ) and Wrist Injury    [place]:  Gerson Pineda is a 63 y.o. female that presents with complaint of right wrist pain. She was walking down wooden steps, her flip-flop caught in between and she tripped and fell forward landing on the right hand and wrist.  Has swelling, states she thinks she broke it. Pain is 10 out of 10. Called her friend to bring her over. Did not strike her head. Has abrasion on the ulnar aspect of the wrist and on the elbow. No pain at the elbow otherwise. No syncope or loss of consciousness. No weakness or numbness. Worse with movement. Has broken both wrists before when was in fourth grade, no current orthopedic doctor. ROS:  At least 4 systems reviewed an negative except as above.      Past Medical History:   Diagnosis Date    Allergic rhinitis     flowers    Anxiety     Arthritis     left hip    Chronic back pain     Depression     Essential hypertension 2016    Fall     \"fell coming in the door at home on 2015- went to ER- put brace on and sent me home then 2015 fell again- rehurt my left knee- for surgery\"( 2015\")    Hyperlipidemia 10/28/2016    Osteoarthritis     Restless legs syndrome     Sciatic pain     Tobacco abuse      Past Surgical History:   Procedure Laterality Date    EYE SURGERY Left     At age 15\"for lazy eye\"    FRACTURE SURGERY  bilat wrist fx as a child    \"fell off a swing set- broke both wrists\"    PATELLA SURGERY Left 12/17/15    ORIF patella     TUBAL LIGATION  1985     Family History   Problem Relation Age of Onset    Cancer Mother         lung ca    High Blood Pressure Mother     Stroke Father     COPD Father    [facility] (NORCO) 5-325 MG per tablet Take 1 tablet by mouth every 6 hours as needed for Pain for up to 3 days. 10 tablet 0    simvastatin (ZOCOR) 20 MG tablet Take 1 tablet by mouth nightly 30 tablet 3    QUEtiapine (SEROQUEL) 100 MG tablet Take 1 tablet by mouth nightly 30 tablet 3    hydrochlorothiazide (HYDRODIURIL) 25 MG tablet Take 1 tablet by mouth daily 30 tablet 3    escitalopram (LEXAPRO) 10 MG tablet Take 1 tablet by mouth daily 30 tablet 3    celecoxib (CELEBREX) 100 MG capsule Take 1 capsule by mouth 2 times daily 60 capsule 3    pramipexole (MIRAPEX) 0.125 MG tablet Take 1 tablet by mouth nightly 30 tablet 3     Allergies   Allergen Reactions    Flexeril [Cyclobenzaprine] Other (See Comments)     Increases irritability    Lisinopril Other (See Comments)     cough       Nursing Notes Reviewed    Physical Exam:  Triage VS:    ED Triage Vitals [06/28/19 0949]   Enc Vitals Group      BP (!) 148/85      Pulse 78      Resp 16      Temp 98.5 °F (36.9 °C)      Temp Source Oral      SpO2 100 %      Weight 180 lb (81.6 kg)      Height 5' 2\" (1.575 m)      Head Circumference       Peak Flow       Pain Score       Pain Loc       Pain Edu? Excl. in [address]? My pulse ox interpretation is - normal    General appearance:  No acute distress. Skin:  Warm. Dry. Abrasion and small contusion on the ulnar aspect of the right wrist on the volar side, no laceration, no open skin. Abrasion on the right elbow without bleeding or drainage or swelling. Abrasion to right knee with no swelling or tenderness to palpation  Eye:  Extraocular movements intact. Ears, nose, mouth and throat:  Oral mucosa moist   Neck:  Trachea midline. Extremity:  Right wrist with mild swelling noted, tender to palpation. She is able to pronate and supinate with pain, has more pain with attempted flexion of the wrist. nontender over the scaphoid and palm/dorsal hand.  nontender with no swelling over the rest of the forearm and elbow though detection was utilized in the interpretation of this exam. COMPARISON: 11/28/2016 HISTORY: Screening. No current acute breast issues. Negative family history of breast cancer. FINDINGS: There are scattered areas of fibroglandular density. There is no new dominant mass, suspicious microcalcification, or area of architectural distortion. No mammographic evidence of malignancy. BIRADS: BIRADS - CATEGORY 1 Negative, no evidence of malignancy. Normal interval follow-up is recommended in 12 months. OVERALL ASSESSMENT - NEGATIVE A letter of notification will be sent to the patient regarding the results. The [employer] recommends annual mammograms for women 40 years and older. MDM:  27-year-old female with history as above presents with right wrist pain after fall, there is swelling noted, suspect likely fracture. X-rays were ordered. Did obtain hand wrist and forearm as well as elbow given the abrasion of the elbow, distracting injury. Does show comminuted distal radial fracture that extends into the intra-articular space. No significant displacement. Also an ulnar styloid fracture. Splint was placed and checked by myself and is in appropriate position, she is not able to range the wrist at all. Neurovascularly intact distally. She will be discharged to follow-up with orthopedics, short course of Norco given, advised sling, elevation, given strict return precautions, she is comfortable with plan. Clinical Impression:  1. Closed fracture of right wrist, initial encounter    2. Abrasions of multiple sites      Disposition referral (if applicable):   Ponce Rubin MD  [address] [address]  [phone]      for orthopedic follow up    [facility]  [address]  [phone]    If symptoms worsen    Disposition medications (if applicable):  New Prescriptions    HYDROCODONE-ACETAMINOPHEN (NORCO) 5-325 MG PER TABLET    Take 1 tablet by mouth every 6 hours as needed for Pain for up to 3 days. Comment: Please note this report has been produced using speech recognition software and may contain errors related to that system including errors in grammar, punctuation, and spelling, as well as words and phrases that may be inappropriate. Efforts were made to edit the dictations.         Audelia Kiser MD  06/28/19 1942
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
## 2019-07-11 ENCOUNTER — OFFICE VISIT (OUTPATIENT)
Dept: ORTHOPEDIC SURGERY | Age: 59
End: 2019-07-11
Payer: MEDICAID

## 2019-07-11 VITALS — BODY MASS INDEX: 29.95 KG/M2 | HEIGHT: 63 IN | WEIGHT: 169 LBS | RESPIRATION RATE: 16 BRPM

## 2019-07-11 DIAGNOSIS — S52.611A CLOSED DISPLACED FRACTURE OF STYLOID PROCESS OF RIGHT ULNA, INITIAL ENCOUNTER: ICD-10-CM

## 2019-07-11 DIAGNOSIS — S52.351A CLOSED DISPLACED COMMINUTED FRACTURE OF SHAFT OF RIGHT RADIUS, INITIAL ENCOUNTER: Primary | ICD-10-CM

## 2019-07-11 PROCEDURE — 73110 X-RAY EXAM OF WRIST: CPT | Performed by: ORTHOPAEDIC SURGERY

## 2019-07-11 PROCEDURE — 99202 OFFICE O/P NEW SF 15 MIN: CPT | Performed by: ORTHOPAEDIC SURGERY

## 2019-07-11 PROCEDURE — 25600 CLTX DST RDL FX/EPHYS SEP WO: CPT | Performed by: ORTHOPAEDIC SURGERY

## 2019-07-11 RX ORDER — HYDROCODONE BITARTRATE AND ACETAMINOPHEN 5; 325 MG/1; MG/1
TABLET ORAL
Refills: 0 | COMMUNITY
Start: 2019-07-01

## 2019-08-22 ENCOUNTER — OFFICE VISIT (OUTPATIENT)
Dept: ORTHOPEDIC SURGERY | Age: 59
End: 2019-08-22
Payer: MEDICAID

## 2019-08-22 VITALS — OXYGEN SATURATION: 98 % | BODY MASS INDEX: 29.96 KG/M2 | HEART RATE: 78 BPM | WEIGHT: 169.09 LBS | HEIGHT: 63 IN

## 2019-08-22 DIAGNOSIS — S52.351A CLOSED DISPLACED COMMINUTED FRACTURE OF SHAFT OF RIGHT RADIUS, INITIAL ENCOUNTER: Primary | ICD-10-CM

## 2019-08-22 PROCEDURE — 73110 X-RAY EXAM OF WRIST: CPT | Performed by: ORTHOPAEDIC SURGERY

## 2019-08-22 PROCEDURE — 99024 POSTOP FOLLOW-UP VISIT: CPT | Performed by: ORTHOPAEDIC SURGERY
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
## 2022-10-15 ENCOUNTER — APPOINTMENT (OUTPATIENT)
Dept: CT IMAGING | Age: 62
End: 2022-10-15

## 2022-10-15 ENCOUNTER — HOSPITAL ENCOUNTER (EMERGENCY)
Age: 62
Discharge: HOME | End: 2022-10-15
Attending: EMERGENCY MEDICINE

## 2022-10-15 VITALS
HEART RATE: 56 BPM | TEMPERATURE: 97.3 F | SYSTOLIC BLOOD PRESSURE: 113 MMHG | HEIGHT: 62 IN | DIASTOLIC BLOOD PRESSURE: 85 MMHG | BODY MASS INDEX: 29.08 KG/M2 | OXYGEN SATURATION: 100 % | RESPIRATION RATE: 18 BRPM | WEIGHT: 158 LBS

## 2022-10-15 DIAGNOSIS — I10 ESSENTIAL HYPERTENSION: ICD-10-CM

## 2022-10-15 DIAGNOSIS — R42 VERTIGO: Primary | ICD-10-CM

## 2022-10-15 LAB
ALBUMIN SERPL-MCNC: 4.2 GM/DL (ref 3.4–5)
ALP BLD-CCNC: 104 IU/L (ref 40–129)
ALT SERPL-CCNC: 6 U/L (ref 10–40)
ANION GAP SERPL CALCULATED.3IONS-SCNC: 12 MMOL/L (ref 4–16)
AST SERPL-CCNC: 10 IU/L (ref 15–37)
BASOPHILS ABSOLUTE: 0.1 K/CU MM
BASOPHILS RELATIVE PERCENT: 0.8 % (ref 0–1)
BILIRUB SERPL-MCNC: 0.4 MG/DL (ref 0–1)
BUN BLDV-MCNC: 8 MG/DL (ref 6–23)
CALCIUM SERPL-MCNC: 9.8 MG/DL (ref 8.3–10.6)
CHLORIDE BLD-SCNC: 104 MMOL/L (ref 99–110)
CO2: 24 MMOL/L (ref 21–32)
CREAT SERPL-MCNC: 0.6 MG/DL (ref 0.6–1.1)
DIFFERENTIAL TYPE: ABNORMAL
EKG ATRIAL RATE: 61 BPM
EKG DIAGNOSIS: NORMAL
EKG P AXIS: 25 DEGREES
EKG P-R INTERVAL: 124 MS
EKG Q-T INTERVAL: 412 MS
EKG QRS DURATION: 84 MS
EKG QTC CALCULATION (BAZETT): 414 MS
EKG R AXIS: -1 DEGREES
EKG T AXIS: 31 DEGREES
EKG VENTRICULAR RATE: 61 BPM
EOSINOPHILS ABSOLUTE: 0.2 K/CU MM
EOSINOPHILS RELATIVE PERCENT: 2.1 % (ref 0–3)
GFR AFRICAN AMERICAN: >60 ML/MIN/1.73M2
GFR NON-AFRICAN AMERICAN: >60 ML/MIN/1.73M2
GLUCOSE BLD-MCNC: 103 MG/DL (ref 70–99)
HCT VFR BLD CALC: 45.3 % (ref 37–47)
HEMOGLOBIN: 15.4 GM/DL (ref 12.5–16)
IMMATURE NEUTROPHIL %: 0.1 % (ref 0–0.43)
LYMPHOCYTES ABSOLUTE: 1.7 K/CU MM
LYMPHOCYTES RELATIVE PERCENT: 22.6 % (ref 24–44)
MAGNESIUM: 1.9 MG/DL (ref 1.8–2.4)
MCH RBC QN AUTO: 30.7 PG (ref 27–31)
MCHC RBC AUTO-ENTMCNC: 34 % (ref 32–36)
MCV RBC AUTO: 90.2 FL (ref 78–100)
MONOCYTES ABSOLUTE: 0.5 K/CU MM
MONOCYTES RELATIVE PERCENT: 6.4 % (ref 0–4)
PDW BLD-RTO: 12.3 % (ref 11.7–14.9)
PLATELET # BLD: 278 K/CU MM (ref 140–440)
PMV BLD AUTO: 9.7 FL (ref 7.5–11.1)
POTASSIUM SERPL-SCNC: 3.9 MMOL/L (ref 3.5–5.1)
RBC # BLD: 5.02 M/CU MM (ref 4.2–5.4)
SEGMENTED NEUTROPHILS ABSOLUTE COUNT: 5.3 K/CU MM
SEGMENTED NEUTROPHILS RELATIVE PERCENT: 68 % (ref 36–66)
SODIUM BLD-SCNC: 140 MMOL/L (ref 135–145)
TOTAL IMMATURE NEUTOROPHIL: 0.01 K/CU MM
TOTAL PROTEIN: 7.4 GM/DL (ref 6.4–8.2)
TROPONIN T: <0.01 NG/ML
TSH HIGH SENSITIVITY: 1.17 UIU/ML (ref 0.27–4.2)
WBC # BLD: 7.7 K/CU MM (ref 4–10.5)

## 2022-10-15 PROCEDURE — 84484 ASSAY OF TROPONIN QUANT: CPT

## 2022-10-15 PROCEDURE — 70450 CT HEAD/BRAIN W/O DYE: CPT

## 2022-10-15 PROCEDURE — 84443 ASSAY THYROID STIM HORMONE: CPT

## 2022-10-15 PROCEDURE — 80053 COMPREHEN METABOLIC PANEL: CPT

## 2022-10-15 PROCEDURE — 2580000003 HC RX 258: Performed by: EMERGENCY MEDICINE

## 2022-10-15 PROCEDURE — 93010 ELECTROCARDIOGRAM REPORT: CPT | Performed by: INTERNAL MEDICINE

## 2022-10-15 PROCEDURE — 83735 ASSAY OF MAGNESIUM: CPT

## 2022-10-15 PROCEDURE — 85025 COMPLETE CBC W/AUTO DIFF WBC: CPT

## 2022-10-15 PROCEDURE — 93005 ELECTROCARDIOGRAM TRACING: CPT | Performed by: EMERGENCY MEDICINE

## 2022-10-15 PROCEDURE — 99284 EMERGENCY DEPT VISIT MOD MDM: CPT

## 2022-10-15 PROCEDURE — 6370000000 HC RX 637 (ALT 250 FOR IP): Performed by: EMERGENCY MEDICINE

## 2022-10-15 RX ORDER — AMLODIPINE BESYLATE 5 MG/1
5 TABLET ORAL ONCE
Status: COMPLETED | OUTPATIENT
Start: 2022-10-15 | End: 2022-10-15

## 2022-10-15 RX ORDER — MECLIZINE HCL 12.5 MG/1
12.5 TABLET ORAL ONCE
Status: COMPLETED | OUTPATIENT
Start: 2022-10-15 | End: 2022-10-15

## 2022-10-15 RX ORDER — 0.9 % SODIUM CHLORIDE 0.9 %
1000 INTRAVENOUS SOLUTION INTRAVENOUS ONCE
Status: COMPLETED | OUTPATIENT
Start: 2022-10-15 | End: 2022-10-15

## 2022-10-15 RX ORDER — AMLODIPINE BESYLATE 5 MG/1
5 TABLET ORAL DAILY
Qty: 30 TABLET | Refills: 0 | Status: SHIPPED | OUTPATIENT
Start: 2022-10-15 | End: 2022-10-15 | Stop reason: SDUPTHER

## 2022-10-15 RX ORDER — MECLIZINE HYDROCHLORIDE 25 MG/1
25 TABLET ORAL 3 TIMES DAILY PRN
Qty: 30 TABLET | Refills: 0 | Status: SHIPPED | OUTPATIENT
Start: 2022-10-15 | End: 2022-10-15 | Stop reason: SDUPTHER

## 2022-10-15 RX ORDER — AMLODIPINE BESYLATE 5 MG/1
5 TABLET ORAL DAILY
Qty: 30 TABLET | Refills: 0 | Status: SHIPPED | OUTPATIENT
Start: 2022-10-15

## 2022-10-15 RX ORDER — MECLIZINE HYDROCHLORIDE 25 MG/1
25 TABLET ORAL 3 TIMES DAILY PRN
Qty: 30 TABLET | Refills: 0 | Status: SHIPPED | OUTPATIENT
Start: 2022-10-15 | End: 2022-10-25

## 2022-10-15 RX ADMIN — SODIUM CHLORIDE 1000 ML: 9 INJECTION, SOLUTION INTRAVENOUS at 14:54

## 2022-10-15 RX ADMIN — AMLODIPINE BESYLATE 5 MG: 5 TABLET ORAL at 14:54

## 2022-10-15 RX ADMIN — MECLIZINE 12.5 MG: 12.5 TABLET ORAL at 14:54

## 2022-10-15 ASSESSMENT — PAIN DESCRIPTION - DESCRIPTORS: DESCRIPTORS: ACHING

## 2022-10-15 ASSESSMENT — LIFESTYLE VARIABLES
HOW MANY STANDARD DRINKS CONTAINING ALCOHOL DO YOU HAVE ON A TYPICAL DAY: 1 OR 2
HOW OFTEN DO YOU HAVE A DRINK CONTAINING ALCOHOL: MONTHLY OR LESS

## 2022-10-15 ASSESSMENT — PAIN DESCRIPTION - FREQUENCY: FREQUENCY: CONTINUOUS

## 2022-10-15 ASSESSMENT — PAIN - FUNCTIONAL ASSESSMENT: PAIN_FUNCTIONAL_ASSESSMENT: 0-10

## 2022-10-15 ASSESSMENT — PAIN DESCRIPTION - LOCATION: LOCATION: HEAD

## 2022-10-15 ASSESSMENT — PAIN SCALES - GENERAL: PAINLEVEL_OUTOF10: 2

## 2022-10-15 NOTE — ED PROVIDER NOTES
[facility]      Pt Name: Sloan Montano  MRN: [id number]  [place] 1960  Date of evaluation: 10/15/2022  Provider: Marisa Martin, [address] [place] [address]       Chief Complaint   Patient presents with    Dizziness     C/o feeling dizzy when she stands up for two weeks . Not taking BP meds for 2 years          HISTORY OF PRESENT ILLNESS   (Location/Symptom, Timing/Onset, Context/Setting, Quality, Duration, Modifying Factors, Severity)  Note limiting factors. Sloan Montano is a 65 y.o. female who presents to the emergency department chief complaint of dizziness worse with positional changes does have a history of high blood pressure states that she smokes marijuana and her primary care physician discharged her from the practice chronic back pain anxiety depression hypertension hyperlipidemia restless leg syndrome she does smoke a pack a day gradual onset going on for over a month worse with positional changes no other aggravating associated leaving signs or symptoms    HPI    Nursing Notes were reviewed. REVIEW OF SYSTEMS    (2-9 systems for level 4, 10 or more for level 5)     Review of Systems  Dizziness high blood pressure 10 system review is otherwise negative  Except as noted above the remainder of the review of systems was reviewed and negative.        PAST MEDICAL HISTORY     Past Medical History:   Diagnosis Date    Allergic rhinitis     flowers    Anxiety     Arthritis     left hip    Chronic back pain     Depression     Essential hypertension 7/8/2016    Fall     \"fell coming in the door at home on 12/11/2015- went to ER- put brace on and sent me home then 12/13/2015 fell again- rehurt my left knee- for surgery\"( 12/17/2015\")    Hyperlipidemia 10/28/2016    Osteoarthritis     Restless legs syndrome     Sciatic pain     Tobacco abuse          SURGICAL HISTORY       Past Surgical History:   Procedure Laterality Date    EYE SURGERY Left At age 17\"for lazy eye\"    FRACTURE SURGERY  bilat wrist fx as a child    \"fell off a swing set- broke both wrists\"    PATELLA SURGERY Left 12/17/15    ORIF patella     TUBAL LIGATION  1985         CURRENT MEDICATIONS       Previous Medications    CARISOPRODOL (SOMA) 350 MG TABLET    Take 350 mg by mouth 4 times daily as needed for Muscle spasms.     CELECOXIB (CELEBREX) 100 MG CAPSULE    Take 1 capsule by mouth 2 times daily    HYDROCHLOROTHIAZIDE (HYDRODIURIL) 25 MG TABLET    Take 1 tablet by mouth daily    HYDROCODONE-ACETAMINOPHEN (NORCO) 5-325 MG PER TABLET        PRAMIPEXOLE (MIRAPEX) 0.125 MG TABLET    Take 1 tablet by mouth nightly    QUETIAPINE (SEROQUEL) 100 MG TABLET    Take 1 tablet by mouth nightly    SIMVASTATIN (ZOCOR) 20 MG TABLET    Take 1 tablet by mouth nightly       ALLERGIES     Flexeril [cyclobenzaprine] and Lisinopril    FAMILY HISTORY       Family History   Problem Relation Age of Onset    Cancer Mother         lung ca    High Blood Pressure Mother     Stroke Father     COPD Father     Heart Disease Father     Kidney Disease Brother     Diabetes Brother     Glaucoma Brother     Diabetes Brother     High Blood Pressure Sister     Migraines Daughter     Diabetes Brother     Mental Retardation Brother           SOCIAL HISTORY       Social History     Socioeconomic History    Marital status:      Spouse name: None    Number of children: None    Years of education: None    Highest education level: None   Tobacco Use    Smoking status: Every Day     Packs/day: 1.00     Years: 30.00     Pack years: 30.00     Types: Cigarettes    Smokeless tobacco: Never   Vaping Use    Vaping Use: Never used   Substance and Sexual Activity    Alcohol use: No    Drug use: Yes     Types: Marijuana Aloma Crystal)    Sexual activity: Yes     Partners: Male       SCREENINGS         Maxwell Coma Scale  Eye Opening: Spontaneous  Best Verbal Response: Oriented  Best Motor Response: Obeys commands  Maxwell Coma Scale Score: 15 WA Assessment  BP: 113/85  Heart Rate: 56                 PHYSICAL EXAM    (up to 7 for level 4, 8 or more for level 5)     ED Triage Vitals [10/15/22 1227]   BP Temp Temp Source Heart Rate Resp SpO2 Height Weight   (!) 172/86 97.3 °F (36.3 °C) Oral 76 18 97 % 5' 2\" (1.575 m) 158 lb (71.7 kg)       Physical Exam  Constitutional: Patient is awake alert oriented to person place and time. In no acute distress. Well nourished, non-toxic appearance. HENT:  Head is normocephalic and atraumatic. Neck:  Supple full ROM   Eyes:  pupils are equally round reactive light extraoccular motor are intact there's no injection no icterus, Conjunctiva normal, no discharge. Respiratory:  Lungs are clear no increased work of breathing noted. No obvious wheezing or stridor  Cardiovascular: No tachycardia noted  GI:  Abdomen does not appear distended   Musculoskeletal:  Moving all 4 extremities with ease extremities distally are warm and viable  Integument:  Warm, dry there are no lacerations no abscess identified. Neurologic:  Alert & oriented X4  with no focal deficits noted. Psychiatric:  Affect appropriate for the situation no homicidal or suicidal thoughts    Mental Status: Oriented to person, place, problem, and time. Fluent speech. Patient with good Foundation of knowledge. Normal attention span and concentration. There are no high risk historical features including age greater than 4 maximal intensity within one hour of onset worse headache of life neck pain or stiffness loss of consciousness onset on exertion vomiting, patient arrived via private vehicle  Cranial Nerves:   II: Visual fields: Full to confrontation  III: Pupils: equal, round, reactive to light  III,IV,VI: Extra Ocular Movements are intact.  No nystagmus  V: Facial sensation is intact   VII: Facial strength and movements: intact and symmetric smile,cheek puffing and eyebrow elevation  VIII: Hearing: Good to finger rub bilaterally  IX: Palate elevation is symmetric  XI: Shoulder shrug is intact  XII: Tongue movements are normal  There is no truncal ataxiathere is no upward or downward gaze abnormality that return to normal with uncovering of the eye i.e. there is a negetive skew test  Musculoskeletal: 5/5 in all 4 extremities. Normal tone. Reflexes: Grossly intact bilateral upper and lower extremities  Planters: 5 out of 5 plantar flexion dorsiflexion extensor hallux longus strength bilaterally  Coordination: Grossly intact  Sensation: normal to all modalities in his arms and legs bilaterally   Gait/Posture: Steady no ataxia appreciated  DIAGNOSTIC RESULTS     EKG: All EKG's are interpreted by the Emergency Department Physician who either signs or Co-signs this chart in the absence of a cardiologist.    EKG interpreted by myself shows normal sinus rhythm at 61 bpm no acute ST-T wave changes are identified    RADIOLOGY:   Non-plain film images such as CT, Ultrasound and MRI are read by the radiologist. [facility] radiographic images are visualized and preliminarily interpreted by the emergency physician with the below findings:    CT head shows no intracranial abnormality    Interpretation per the Radiologist below, if available at the time of this note:    CT Head W/O Contrast   Preliminary Result   No acute intracranial abnormality.                ED BEDSIDE ULTRASOUND:   Performed by ED Physician - none    LABS:  Labs Reviewed   COMPREHENSIVE METABOLIC PANEL - Abnormal; Notable for the following components:       Result Value    Glucose 103 (*)     ALT 6 (*)     AST 10 (*)     All other components within normal limits   CBC WITH AUTO DIFFERENTIAL - Abnormal; Notable for the following components:    Segs Relative 68.0 (*)     Lymphocytes % 22.6 (*)     Monocytes % 6.4 (*)     All other components within normal limits   TROPONIN   TSH   MAGNESIUM   URINALYSIS       All other labs were within normal range or not returned as of this dictation. EMERGENCY DEPARTMENT COURSE and DIFFERENTIAL DIAGNOSIS/MDM:   Vitals:    Vitals:    10/15/22 1227 10/15/22 1524   BP: (!) 172/86 113/85   Pulse: 76 56   Resp: 18 18   Temp: 97.3 °F (36.3 °C)    TempSrc: Oral    SpO2: 97% 100%   Weight: 158 lb (71.7 kg)    Height: 5' 2\" (1.575 m)        Initially hypertensive questionable concern for hypertensive encephalopathy versus vertigo CT was normal she was given some blood pressure meds her blood pressure did come down now 113/85 CMP shows glucose 103 otherwise normal CBC is normal, TSH magnesium are normal.  Discharged in stable condition started on Norvasc started on meclizine and referred to a primary care physician instructed return if changes or concerns    MDM        REASSESSMENT          CRITICAL CARE TIME   CRITICAL CARE TIME CRITICAL CARE STATEMENT:   There was a high probability of life-threatening clinical deterioration in the patient's condition requiring my urgent intervention. The services I provided to this patient were to treat and/or prevent clinically significant deterioration that could result in:   __respiratory failure   __neurologic failure   __circulatory failure/ACS/ Chest pain/ Unstable angina  _X_ Sever Blood pressure abnormality requiring intervention   __overwhelming infection/ possible urgent surgical situation   __renal failure   __severe shock   __metabolic failure   __multi-organ failure   __preventing self harm/harm to staff or other Psychiatric  Services included the following: chart data review, reviewing nursing notes and/or old charts, documentation time, consultant collaboration regarding findings and treatment options, medication orders and management, direct patient care, re-evaluations, vital sign assessments and ordering, interpreting and reviewing diagnostic studies/lab tests.    Aggregate critical care time was 35 minutes, which includes only time during which I was engaged in work directly related to the patient's care, as described above, whether at the bedside or elsewhere in the Emergency Department, or in consultation with specialist. It did not include time spent performing other reported procedures or the services of residents, students, nurses or physician assistants and is in addition to and exclusion of all other seperatly billable charges and procedures. This does include blood pressure monitoring, monitoring of other vital signs as well as frequent patient reassessment for any deterioration in their condition. CONSULTS:  None    PROCEDURES:  Unless otherwise noted below, none     Procedures      FINAL IMPRESSION      1. Vertigo    2. Essential hypertension          DISPOSITION/PLAN   DISPOSITION Decision To Discharge 10/15/2022 03:43:10 PM      PATIENT REFERRED TO:  MADY Burroughs CNP  [address]  [phone]    Schedule an appointment as soon as possible for a visit       [facility] Emergency Department  [address] [place] [address]  [phone]    If symptoms worsen      DISCHARGE MEDICATIONS:  New Prescriptions    AMLODIPINE (NORVASC) 5 MG TABLET    Take 1 tablet by mouth daily    MECLIZINE (ANTIVERT) 25 MG TABLET    Take 1 tablet by mouth 3 times daily as needed for Dizziness     Controlled Substances Monitoring:     RX Monitoring 12/4/2018   Attestation The Prescription Monitoring Report for this patient was reviewed today. Periodic Controlled Substance Monitoring Obtaining appropriate analgesic effect of treatment. Chronic Pain > 80 MEDD Reviewed the patient's functional status and documentation.        (Please note that portions of this note were completed with a voice recognition program.  Efforts were made to edit the dictations but occasionally words are mis-transcribed.)    Kelby Neely DO (electronically signed)  Attending Emergency Physician            Kelby Neely DO  10/15/22 [id number]

## 2022-11-11 ENCOUNTER — OFFICE VISIT (OUTPATIENT)
Dept: INTERNAL MEDICINE CLINIC | Age: 62
End: 2022-11-11

## 2022-11-11 VITALS
WEIGHT: 158 LBS | HEART RATE: 86 BPM | OXYGEN SATURATION: 96 % | HEIGHT: 62 IN | BODY MASS INDEX: 29.08 KG/M2 | DIASTOLIC BLOOD PRESSURE: 88 MMHG | SYSTOLIC BLOOD PRESSURE: 132 MMHG

## 2022-11-11 DIAGNOSIS — M62.830 BACK MUSCLE SPASM: ICD-10-CM

## 2022-11-11 DIAGNOSIS — R06.2 WHEEZE: ICD-10-CM

## 2022-11-11 DIAGNOSIS — M16.0 OSTEOARTHRITIS OF BOTH HIPS, UNSPECIFIED OSTEOARTHRITIS TYPE: ICD-10-CM

## 2022-11-11 DIAGNOSIS — G47.00 INSOMNIA, UNSPECIFIED TYPE: Primary | ICD-10-CM

## 2022-11-11 DIAGNOSIS — I10 ESSENTIAL HYPERTENSION: ICD-10-CM

## 2022-11-11 DIAGNOSIS — F33.9 EPISODE OF RECURRENT MAJOR DEPRESSIVE DISORDER, UNSPECIFIED DEPRESSION EPISODE SEVERITY (HCC): ICD-10-CM

## 2022-11-11 PROCEDURE — 99204 OFFICE O/P NEW MOD 45 MIN: CPT | Performed by: PHYSICIAN ASSISTANT

## 2022-11-11 PROCEDURE — 3078F DIAST BP <80 MM HG: CPT | Performed by: PHYSICIAN ASSISTANT

## 2022-11-11 PROCEDURE — 3074F SYST BP LT 130 MM HG: CPT | Performed by: PHYSICIAN ASSISTANT

## 2022-11-11 RX ORDER — MELOXICAM 7.5 MG/1
7.5 TABLET ORAL DAILY
Qty: 30 TABLET | Refills: 1 | Status: SHIPPED | OUTPATIENT
Start: 2022-11-11

## 2022-11-11 RX ORDER — AMLODIPINE BESYLATE 5 MG/1
5 TABLET ORAL DAILY
Qty: 30 TABLET | Refills: 1 | Status: SHIPPED | OUTPATIENT
Start: 2022-11-11

## 2022-11-11 RX ORDER — ALBUTEROL SULFATE 90 UG/1
2 AEROSOL, METERED RESPIRATORY (INHALATION) 4 TIMES DAILY PRN
Qty: 54 G | Refills: 1 | Status: SHIPPED | OUTPATIENT
Start: 2022-11-11

## 2022-11-11 RX ORDER — QUETIAPINE FUMARATE 50 MG/1
50-100 TABLET, FILM COATED ORAL NIGHTLY
Qty: 60 TABLET | Refills: 1 | Status: SHIPPED | OUTPATIENT
Start: 2022-11-11

## 2022-11-11 RX ORDER — CARISOPRODOL 250 MG/1
250 TABLET ORAL 2 TIMES DAILY PRN
Qty: 28 TABLET | Refills: 0 | Status: SHIPPED | OUTPATIENT
Start: 2022-11-11 | End: 2022-11-25

## 2022-11-11 ASSESSMENT — ENCOUNTER SYMPTOMS
BLOOD IN STOOL: 0
BACK PAIN: 1
PHOTOPHOBIA: 0
DIARRHEA: 0
COLOR CHANGE: 0
EYE PAIN: 0
EYE REDNESS: 0
CONSTIPATION: 0
NAUSEA: 0
WHEEZING: 0
CHEST TIGHTNESS: 0
SHORTNESS OF BREATH: 0
COUGH: 0
ABDOMINAL PAIN: 0
SORE THROAT: 0
EYE DISCHARGE: 0
RHINORRHEA: 0
VOMITING: 0

## 2022-11-11 NOTE — PROGRESS NOTES
Dorita Sherwood (:  1960) is a 65 y.o. female,New patient, here for evaluation of the following chief complaint(s):    Hypertension    This is my first patient encounter with Dorita Sherwood; chart reviewed. SUBJECTIVE/OBJECTIVE:  HPI  Alondra Mendez is a pleasant 65 y.o. female presenting to clinic today for medication refill. HTN - pt has been off meds for past 2 years; initated on amlodipine 5 at ED visit last month; reprts has gayle controlled at home since; reports feeling better. Insomnia - off seroquel for past 2 years; sleeping off and on through night; no snoring or HANNAH symptoms; patient states she would like to get back on Seroquel. Arthritis/back pain. -Patient reports longstanding history of low back pain; reports he previously established with pain management however was not interested in injection; patient reports prior benefit with Soma; states no prior benefit with muscle relaxer; patient reports occasional spasming and cramps down her left leg . Allergies   Allergen Reactions    Flexeril [Cyclobenzaprine] Other (See Comments)     Increases irritability    Lisinopril Other (See Comments)     cough       Current Outpatient Medications   Medication Sig Dispense Refill    QUEtiapine (SEROQUEL) 50 MG tablet Take 1-2 tablets by mouth nightly 60 tablet 1    amLODIPine (NORVASC) 5 MG tablet Take 1 tablet by mouth daily 30 tablet 1    carisoprodol (SOMA) 250 MG tablet Take 1 tablet by mouth 2 times daily as needed (back spasms/pains) for up to 14 days.  28 tablet 0    meloxicam (MOBIC) 7.5 MG tablet Take 1 tablet by mouth daily 30 tablet 1    albuterol sulfate HFA (VENTOLIN HFA) 108 (90 Base) MCG/ACT inhaler Inhale 2 puffs into the lungs 4 times daily as needed for Wheezing 54 g 1    HYDROcodone-acetaminophen (NORCO) 5-325 MG per tablet  (Patient not taking: Reported on 2022)  0    simvastatin (ZOCOR) 20 MG tablet Take 1 tablet by mouth nightly (Patient not taking: Reported on 11/11/2022) 30 tablet 3    celecoxib (CELEBREX) 100 MG capsule Take 1 capsule by mouth 2 times daily (Patient not taking: Reported on 11/11/2022) 60 capsule 3    pramipexole (MIRAPEX) 0.125 MG tablet Take 1 tablet by mouth nightly (Patient not taking: Reported on 11/11/2022) 30 tablet 3     No current facility-administered medications for this visit. /88   Pulse 86   Ht 5' 2\" (1.575 m)   Wt 158 lb (71.7 kg)   SpO2 96%   BMI 28.90 kg/m²     Review of Systems   Constitutional:  Negative for appetite change, chills, fatigue and fever. HENT:  Negative for congestion, ear pain, hearing loss, rhinorrhea and sore throat. Eyes:  Negative for photophobia, pain, discharge and redness. Respiratory:  Negative for cough, chest tightness, shortness of breath and wheezing. Cardiovascular:  Negative for chest pain, palpitations and leg swelling. Gastrointestinal:  Negative for abdominal pain, blood in stool, constipation, diarrhea, nausea and vomiting. Endocrine: Negative for polyuria. Genitourinary:  Negative for difficulty urinating, dysuria, flank pain, frequency, hematuria and urgency. Musculoskeletal:  Positive for arthralgias and back pain. Negative for gait problem and joint swelling. Skin:  Negative for color change and rash. Neurological:  Negative for dizziness, syncope, weakness, light-headedness and headaches. Hematological:  Negative for adenopathy. Psychiatric/Behavioral:  Positive for sleep disturbance. Negative for agitation, behavioral problems and suicidal ideas. The patient is not nervous/anxious. Physical Exam  HENT:      Head: Normocephalic and atraumatic. Right Ear: External ear normal.      Left Ear: External ear normal.   Cardiovascular:      Rate and Rhythm: Regular rhythm. Pulses: Normal pulses. Pulmonary:      Effort: No respiratory distress. Musculoskeletal:         General: Normal range of motion. Skin:     General: Skin is warm and dry. Neurological:      General: No focal deficit present. Mental Status: She is alert and oriented to person, place, and time. Mental status is at baseline. Psychiatric:         Behavior: Behavior normal.       ASSESSMENT/PLAN:  1. Insomnia, unspecified type   -Can reinitiate Seroquel; trial 50 mg every few weeks, if symptoms not improve, can increase back to prior dosing of 100 mg nightly. Reviewed proper use, monitoring, side effects. Follow-up with PCP for consideration of reinitiation of Mirapex etc.  Reviewed sleep hygiene. -     QUEtiapine (SEROQUEL) 50 MG tablet; Take 1-2 tablets by mouth nightly, Disp-60 tablet, R-1Normal  2. Episode of recurrent major depressive disorder, unspecified depression episode severity (HCC)  -     QUEtiapine (SEROQUEL) 50 MG tablet; Take 1-2 tablets by mouth nightly, Disp-60 tablet, R-1Normal  3. Essential hypertension   Blood pressure control at home and in office; continue amlodipine. Follow-up with PCP. DASH diet, weight loss, smoking cessation, physical activity. -     amLODIPine (NORVASC) 5 MG tablet; Take 1 tablet by mouth daily, Disp-30 tablet, R-1Normal  4. Osteoarthritis of both hips, unspecified osteoarthritis type  -     meloxicam (MOBIC) 7.5 MG tablet; Take 1 tablet by mouth daily, Disp-30 tablet, R-1Normal  5. Back muscle spasm   -Advised patient that Michaela Chol is only to be used for short duration; recommend other muscle relaxers etc.; patient reports his other medications provide benefit. Short course of Soma sent in for patient. Reviewed proper use, monitoring, side effects, risk of dependency, withdrawal etc.  Patient declines referral to pain management. -     carisoprodol (SOMA) 250 MG tablet; Take 1 tablet by mouth 2 times daily as needed (back spasms/pains) for up to 14 days. , Disp-28 tablet, R-0Normal  6.  Wheeze   -Longtime smoker; recently smoked about a pack per day; wheezing noted on exam; patient reports very mild occasional shortness of breath with activity; albuterol as needed. Follow-up with PCP. Patient counseled in length on smoking cessation including benefits of quitting and health risks of continuing to smoke including but not limited to lung cancer, COPD, risk of coronary artery disease. Patient verbalized understanding today. -     albuterol sulfate HFA (VENTOLIN HFA) 108 (90 Base) MCG/ACT inhaler; Inhale 2 puffs into the lungs 4 times daily as needed for Wheezing, Disp-54 g, R-1Normal    Return in about 4 weeks (around 12/9/2022), or if symptoms worsen or fail to improve, for Follow Up. An electronic signature was used to authenticate this note.     --JYOTI Baeza

## 2023-01-09 ENCOUNTER — OFFICE VISIT (OUTPATIENT)
Dept: INTERNAL MEDICINE CLINIC | Age: 63
End: 2023-01-09

## 2023-01-09 VITALS
BODY MASS INDEX: 29.08 KG/M2 | OXYGEN SATURATION: 99 % | DIASTOLIC BLOOD PRESSURE: 78 MMHG | SYSTOLIC BLOOD PRESSURE: 130 MMHG | WEIGHT: 158 LBS | HEIGHT: 62 IN | HEART RATE: 84 BPM

## 2023-01-09 DIAGNOSIS — G25.81 RESTLESS LEGS SYNDROME: ICD-10-CM

## 2023-01-09 DIAGNOSIS — E78.5 HYPERLIPIDEMIA, UNSPECIFIED HYPERLIPIDEMIA TYPE: ICD-10-CM

## 2023-01-09 DIAGNOSIS — M62.830 BACK MUSCLE SPASM: Primary | ICD-10-CM

## 2023-01-09 DIAGNOSIS — I10 ESSENTIAL HYPERTENSION: ICD-10-CM

## 2023-01-09 DIAGNOSIS — G47.00 INSOMNIA, UNSPECIFIED TYPE: ICD-10-CM

## 2023-01-09 DIAGNOSIS — M16.0 OSTEOARTHRITIS OF BOTH HIPS, UNSPECIFIED OSTEOARTHRITIS TYPE: ICD-10-CM

## 2023-01-09 DIAGNOSIS — F33.9 EPISODE OF RECURRENT MAJOR DEPRESSIVE DISORDER, UNSPECIFIED DEPRESSION EPISODE SEVERITY (HCC): ICD-10-CM

## 2023-01-09 PROCEDURE — 3078F DIAST BP <80 MM HG: CPT | Performed by: PHYSICIAN ASSISTANT

## 2023-01-09 PROCEDURE — 3075F SYST BP GE 130 - 139MM HG: CPT | Performed by: PHYSICIAN ASSISTANT

## 2023-01-09 PROCEDURE — 99214 OFFICE O/P EST MOD 30 MIN: CPT | Performed by: PHYSICIAN ASSISTANT

## 2023-01-09 RX ORDER — PRAMIPEXOLE DIHYDROCHLORIDE 0.12 MG/1
0.12 TABLET ORAL NIGHTLY
Qty: 90 TABLET | Refills: 1 | Status: SHIPPED | OUTPATIENT
Start: 2023-01-09

## 2023-01-09 RX ORDER — QUETIAPINE FUMARATE 50 MG/1
50-100 TABLET, FILM COATED ORAL NIGHTLY
Qty: 90 TABLET | Refills: 1 | Status: SHIPPED | OUTPATIENT
Start: 2023-01-09

## 2023-01-09 RX ORDER — AMLODIPINE BESYLATE 5 MG/1
5 TABLET ORAL DAILY
Qty: 90 TABLET | Refills: 1 | Status: SHIPPED | OUTPATIENT
Start: 2023-01-09

## 2023-01-09 RX ORDER — SIMVASTATIN 20 MG
20 TABLET ORAL NIGHTLY
Qty: 90 TABLET | Refills: 1 | Status: SHIPPED | OUTPATIENT
Start: 2023-01-09

## 2023-01-09 RX ORDER — CARISOPRODOL 350 MG/1
350 TABLET ORAL 2 TIMES DAILY PRN
Qty: 60 TABLET | Refills: 1 | Status: SHIPPED | OUTPATIENT
Start: 2023-01-09 | End: 2023-03-10

## 2023-01-09 RX ORDER — MELOXICAM 7.5 MG/1
7.5 TABLET ORAL DAILY
Qty: 90 TABLET | Refills: 1 | Status: SHIPPED | OUTPATIENT
Start: 2023-01-09

## 2023-01-09 RX ORDER — CELECOXIB 100 MG/1
100 CAPSULE ORAL 2 TIMES DAILY
Qty: 60 CAPSULE | Refills: 3 | Status: CANCELLED | OUTPATIENT
Start: 2023-01-09

## 2023-01-09 ASSESSMENT — ENCOUNTER SYMPTOMS
BLOOD IN STOOL: 0
COLOR CHANGE: 0
ABDOMINAL PAIN: 0
BACK PAIN: 1
WHEEZING: 0
EYE REDNESS: 0
RHINORRHEA: 0
DIARRHEA: 0
PHOTOPHOBIA: 0
VOMITING: 0
SORE THROAT: 0
CONSTIPATION: 0
SHORTNESS OF BREATH: 0
CHEST TIGHTNESS: 0
EYE DISCHARGE: 0
COUGH: 0
NAUSEA: 0
EYE PAIN: 0

## 2023-01-09 NOTE — PROGRESS NOTES
Joaquin Mcrae (:  1960) is a 65 y.o. female,Established patient, here for evaluation of the following chief complaint(s):    Medication Refill      SUBJECTIVE/OBJECTIVE:  KASANDRA Mcrae is a pleasant 65 y.o. female presenting to clinic today for medication refills. Patient reports she does not have insurance and has not yet established with PCP. Patient was seen in clinic about 2 months ago, reinitiated on previously prescribed medications and continued on amlodipine and Mobic. Patient reports she is doing well, denies any new complaints or concerns; reports that Pedro Watters has helped as previously however reports the dose that was prescribed was less effective than previously prescribed. 50 mg; patient is requesting new prescription for Soma. Allergies   Allergen Reactions    Flexeril [Cyclobenzaprine] Other (See Comments)     Increases irritability    Lisinopril Other (See Comments)     cough       Current Outpatient Medications   Medication Sig Dispense Refill    meloxicam (MOBIC) 7.5 MG tablet Take 1 tablet by mouth daily 90 tablet 1    amLODIPine (NORVASC) 5 MG tablet Take 1 tablet by mouth daily 90 tablet 1    simvastatin (ZOCOR) 20 MG tablet Take 1 tablet by mouth nightly 90 tablet 1    QUEtiapine (SEROQUEL) 50 MG tablet Take 1-2 tablets by mouth nightly 90 tablet 1    pramipexole (MIRAPEX) 0.125 MG tablet Take 1 tablet by mouth nightly 90 tablet 1    carisoprodol (SOMA) 350 MG tablet Take 1 tablet by mouth 2 times daily as needed for Muscle spasms for up to 60 days.  Max Daily Amount: 700 mg 60 tablet 1    HYDROcodone-acetaminophen (NORCO) 5-325 MG per tablet   0    celecoxib (CELEBREX) 100 MG capsule Take 1 capsule by mouth 2 times daily 60 capsule 3    albuterol sulfate HFA (VENTOLIN HFA) 108 (90 Base) MCG/ACT inhaler Inhale 2 puffs into the lungs 4 times daily as needed for Wheezing (Patient not taking: Reported on 2023) 54 g 1     No current facility-administered medications for this visit. /78   Pulse 84   Ht 5' 2\" (1.575 m)   Wt 158 lb (71.7 kg)   SpO2 99%   BMI 28.90 kg/m²     Review of Systems   Constitutional:  Negative for appetite change, chills, fatigue and fever. HENT:  Negative for congestion, ear pain, hearing loss, rhinorrhea and sore throat. Eyes:  Negative for photophobia, pain, discharge and redness. Respiratory:  Negative for cough, chest tightness, shortness of breath and wheezing. Cardiovascular:  Negative for chest pain, palpitations and leg swelling. Gastrointestinal:  Negative for abdominal pain, blood in stool, constipation, diarrhea, nausea and vomiting. Endocrine: Negative for polyuria. Genitourinary:  Negative for difficulty urinating, dysuria, flank pain, frequency, hematuria and urgency. Musculoskeletal:  Positive for back pain. Negative for arthralgias, gait problem and joint swelling. Skin:  Negative for color change and rash. Neurological:  Negative for dizziness, syncope, weakness, light-headedness and headaches. Hematological:  Negative for adenopathy. Psychiatric/Behavioral:  Positive for sleep disturbance. Negative for agitation, behavioral problems and suicidal ideas. The patient is not nervous/anxious. Physical Exam  HENT:      Head: Normocephalic and atraumatic. Right Ear: External ear normal.      Left Ear: External ear normal.   Cardiovascular:      Rate and Rhythm: Regular rhythm. Pulses: Normal pulses. Pulmonary:      Effort: No respiratory distress. Musculoskeletal:         General: Normal range of motion. Skin:     General: Skin is warm and dry. Neurological:      General: No focal deficit present. Mental Status: She is alert and oriented to person, place, and time. Mental status is at baseline. Psychiatric:         Behavior: Behavior normal.       ASSESSMENT/PLAN:  1.  Back muscle spasm   -Once again advised patient that she needs to establish with PCP; provided resources to establish; advised patient that Shayna Cummings is a controlled substance and is not routinely used for ongoing maintenance due to risk of dependency, withdrawal etc.  Advised patient only take this when absolutely needed. Patient declines referral to specialist or physical therapy as she does not have insurance at this time. -     carisoprodol (SOMA) 350 MG tablet; Take 1 tablet by mouth 2 times daily as needed for Muscle spasms for up to 60 days. Max Daily Amount: 700 mg, Disp-60 tablet, R-1Normal  2. Osteoarthritis of both hips, unspecified osteoarthritis type   -Orders placed for labs to check kidney function and ensure safety of ongoing medication use. -     meloxicam (MOBIC) 7.5 MG tablet; Take 1 tablet by mouth daily, Disp-90 tablet, R-1Normal  3. Essential hypertension  -     amLODIPine (NORVASC) 5 MG tablet; Take 1 tablet by mouth daily, Disp-90 tablet, R-1Normal  -     CBC with Auto Differential; Future  -     Comprehensive Metabolic Panel; Future  4. Hyperlipidemia, unspecified hyperlipidemia type  -     simvastatin (ZOCOR) 20 MG tablet; Take 1 tablet by mouth nightly, Disp-90 tablet, R-1Normal  -     LIPID PANEL; Future  5. Episode of recurrent major depressive disorder, unspecified depression episode severity (HCC)  -     QUEtiapine (SEROQUEL) 50 MG tablet; Take 1-2 tablets by mouth nightly, Disp-90 tablet, R-1Normal  6. Insomnia, unspecified type  -     QUEtiapine (SEROQUEL) 50 MG tablet; Take 1-2 tablets by mouth nightly, Disp-90 tablet, R-1Normal  7. Restless legs syndrome  -     pramipexole (MIRAPEX) 0.125 MG tablet; Take 1 tablet by mouth nightly, Disp-90 tablet, R-1Normal      Return in about 6 months (around 7/9/2023), or if symptoms worsen or fail to improve, for Follow Up. An electronic signature was used to authenticate this note.     --JYOTI Robert

## 2024-06-27 ENCOUNTER — HOSPITAL ENCOUNTER (OUTPATIENT)
Age: 64
Setting detail: OBSERVATION
Discharge: HOME | End: 2024-06-28
Attending: STUDENT IN AN ORGANIZED HEALTH CARE EDUCATION/TRAINING PROGRAM | Admitting: HOSPITALIST
Payer: COMMERCIAL

## 2024-06-27 ENCOUNTER — APPOINTMENT (OUTPATIENT)
Dept: CT IMAGING | Age: 64
End: 2024-06-27
Payer: COMMERCIAL

## 2024-06-27 DIAGNOSIS — R06.00 DYSPNEA AND RESPIRATORY ABNORMALITIES: ICD-10-CM

## 2024-06-27 DIAGNOSIS — J96.01 ACUTE RESPIRATORY FAILURE WITH HYPOXEMIA (HCC): Primary | ICD-10-CM

## 2024-06-27 DIAGNOSIS — R06.89 DYSPNEA AND RESPIRATORY ABNORMALITIES: ICD-10-CM

## 2024-06-27 DIAGNOSIS — R03.0 ELEVATED BLOOD PRESSURE READING: ICD-10-CM

## 2024-06-27 DIAGNOSIS — M54.6 ACUTE MIDLINE THORACIC BACK PAIN: ICD-10-CM

## 2024-06-27 LAB
ANION GAP SERPL CALCULATED.3IONS-SCNC: 17 MMOL/L (ref 7–16)
BASE EXCESS MIXED: 1.1 (ref 0–3)
BASE EXCESS: ABNORMAL (ref 0–3)
BASOPHILS ABSOLUTE: 0.1 K/CU MM
BASOPHILS RELATIVE PERCENT: 0.8 % (ref 0–1)
BUN SERPL-MCNC: 7 MG/DL (ref 6–23)
CALCIUM SERPL-MCNC: 10.1 MG/DL (ref 8.3–10.6)
CHLORIDE BLD-SCNC: 103 MMOL/L (ref 99–110)
CO2 CONTENT: 26.8 MMOL/L (ref 21–32)
CO2: 21 MMOL/L (ref 21–32)
CREAT SERPL-MCNC: 0.6 MG/DL (ref 0.6–1.1)
DIFFERENTIAL TYPE: ABNORMAL
EOSINOPHILS ABSOLUTE: 0.4 K/CU MM
EOSINOPHILS RELATIVE PERCENT: 4.5 % (ref 0–3)
GFR, ESTIMATED: >90 ML/MIN/1.73M2
GLUCOSE SERPL-MCNC: 113 MG/DL (ref 70–99)
HCO3 ARTERIAL: 25.4 MMOL/L (ref 21–28)
HCT VFR BLD CALC: 46.8 % (ref 37–47)
HEMOGLOBIN: 15.3 GM/DL (ref 12.5–16)
IMMATURE NEUTROPHIL %: 0.2 % (ref 0–0.43)
LYMPHOCYTES ABSOLUTE: 1.4 K/CU MM
LYMPHOCYTES RELATIVE PERCENT: 16.4 % (ref 24–44)
MCH RBC QN AUTO: 29.2 PG (ref 27–31)
MCHC RBC AUTO-ENTMCNC: 32.7 % (ref 32–36)
MCV RBC AUTO: 89.3 FL (ref 78–100)
MONOCYTES ABSOLUTE: 0.5 K/CU MM
MONOCYTES RELATIVE PERCENT: 5.9 % (ref 0–4)
NEUTROPHILS ABSOLUTE: 6.1 K/CU MM
NEUTROPHILS RELATIVE PERCENT: 72.2 % (ref 36–66)
O2 SATURATION: 75.4 % (ref 94–98)
PCO2 ARTERIAL: 47.3 MMHG (ref 35–48)
PDW BLD-RTO: 12.7 % (ref 11.7–14.9)
PH BLOOD: 7.34 (ref 7.35–7.45)
PLATELET # BLD: 232 K/CU MM (ref 140–440)
PMV BLD AUTO: 10.4 FL (ref 7.5–11.1)
PO2 ARTERIAL: 43.3 MMHG (ref 83–108)
POTASSIUM SERPL-SCNC: 3.8 MMOL/L (ref 3.5–5.1)
PRO-BNP: 766.8 PG/ML
RBC # BLD: 5.24 M/CU MM (ref 4.2–5.4)
SODIUM BLD-SCNC: 141 MMOL/L (ref 135–145)
TOTAL IMMATURE NEUTOROPHIL: 0.02 K/CU MM
TROPONIN, HIGH SENSITIVITY: 12 NG/L (ref 0–14)
WBC # BLD: 8.5 K/CU MM (ref 4–10.5)

## 2024-06-27 PROCEDURE — 82805 BLOOD GASES W/O2 SATURATION: CPT

## 2024-06-27 PROCEDURE — 94640 AIRWAY INHALATION TREATMENT: CPT

## 2024-06-27 PROCEDURE — 99285 EMERGENCY DEPT VISIT HI MDM: CPT

## 2024-06-27 PROCEDURE — 93005 ELECTROCARDIOGRAM TRACING: CPT | Performed by: STUDENT IN AN ORGANIZED HEALTH CARE EDUCATION/TRAINING PROGRAM

## 2024-06-27 PROCEDURE — 80048 BASIC METABOLIC PNL TOTAL CA: CPT

## 2024-06-27 PROCEDURE — 6370000000 HC RX 637 (ALT 250 FOR IP): Performed by: EMERGENCY MEDICINE

## 2024-06-27 PROCEDURE — 84484 ASSAY OF TROPONIN QUANT: CPT

## 2024-06-27 PROCEDURE — 83880 ASSAY OF NATRIURETIC PEPTIDE: CPT

## 2024-06-27 PROCEDURE — 85025 COMPLETE CBC W/AUTO DIFF WBC: CPT

## 2024-06-27 PROCEDURE — 74174 CTA ABD&PLVS W/CONTRAST: CPT

## 2024-06-27 PROCEDURE — 6360000004 HC RX CONTRAST MEDICATION: Performed by: EMERGENCY MEDICINE

## 2024-06-27 RX ORDER — IPRATROPIUM BROMIDE AND ALBUTEROL SULFATE 2.5; .5 MG/3ML; MG/3ML
1 SOLUTION RESPIRATORY (INHALATION) ONCE
Status: COMPLETED | OUTPATIENT
Start: 2024-06-27 | End: 2024-06-27

## 2024-06-27 RX ORDER — NICARDIPINE HYDROCHLORIDE 0.1 MG/ML
2.5-15 INJECTION INTRAVENOUS CONTINUOUS
Status: DISCONTINUED | OUTPATIENT
Start: 2024-06-27 | End: 2024-06-28

## 2024-06-27 RX ORDER — PREDNISONE 20 MG/1
40 TABLET ORAL ONCE
Status: COMPLETED | OUTPATIENT
Start: 2024-06-27 | End: 2024-06-27

## 2024-06-27 RX ORDER — PREDNISONE 50 MG/1
50 TABLET ORAL DAILY
Qty: 4 TABLET | Refills: 0 | Status: SHIPPED | OUTPATIENT
Start: 2024-06-28 | End: 2024-07-02

## 2024-06-27 RX ORDER — ALBUTEROL SULFATE 2.5 MG/3ML
SOLUTION RESPIRATORY (INHALATION)
Status: DISPENSED
Start: 2024-06-27 | End: 2024-06-28

## 2024-06-27 RX ADMIN — IOPAMIDOL 75 ML: 755 INJECTION, SOLUTION INTRAVENOUS at 20:14

## 2024-06-27 RX ADMIN — IPRATROPIUM BROMIDE AND ALBUTEROL SULFATE 1 DOSE: 2.5; .5 SOLUTION RESPIRATORY (INHALATION) at 21:31

## 2024-06-27 RX ADMIN — IPRATROPIUM BROMIDE AND ALBUTEROL SULFATE 1 DOSE: 2.5; .5 SOLUTION RESPIRATORY (INHALATION) at 22:27

## 2024-06-27 RX ADMIN — PREDNISONE 40 MG: 20 TABLET ORAL at 21:30

## 2024-06-27 ASSESSMENT — PAIN DESCRIPTION - LOCATION: LOCATION: CHEST;BACK

## 2024-06-27 ASSESSMENT — ENCOUNTER SYMPTOMS
SORE THROAT: 0
ABDOMINAL PAIN: 0
SHORTNESS OF BREATH: 0
VOMITING: 0
NAUSEA: 0
COUGH: 0

## 2024-06-27 ASSESSMENT — PAIN SCALES - GENERAL: PAINLEVEL_OUTOF10: 8

## 2024-06-27 ASSESSMENT — PAIN DESCRIPTION - DESCRIPTORS: DESCRIPTORS: TIGHTNESS

## 2024-06-27 ASSESSMENT — LIFESTYLE VARIABLES: HOW OFTEN DO YOU HAVE A DRINK CONTAINING ALCOHOL: NEVER

## 2024-06-27 ASSESSMENT — PAIN DESCRIPTION - FREQUENCY: FREQUENCY: CONTINUOUS

## 2024-06-27 ASSESSMENT — PAIN - FUNCTIONAL ASSESSMENT: PAIN_FUNCTIONAL_ASSESSMENT: 0-10

## 2024-06-27 ASSESSMENT — PAIN DESCRIPTION - PAIN TYPE: TYPE: ACUTE PAIN

## 2024-06-27 NOTE — ED PROVIDER NOTES
[facility] EMERGENCY DEPARTMENT  Emergency Department Encounter    Pt Name:Alondra Ho  MRN: [id number]  Birthdate 1960  Date of evaluation: 6/27/24  PCP:  Grzegorz Quach MD       CHIEF COMPLAINT       Chief Complaint   Patient presents with    Back Pain     Pt arrives ambulatory stating today her blood pressure has been high and she began having upper back pain.  Pt states she also was having mid chest pain,  Upon arrival pt saturation 80% on room air, pt placed on 4 LNC       HISTORY OF PRESENT ILLNESS     Alondra Ho is a 63 y.o. female who presents with elevated blood pressure and back pain.    Patient has a history of restless leg syndrome, hypertension, hyperlipidemia, chronic low back pain and spasm.    Today she had sudden onset back pain between her shoulder blades that was described as an ache/pressure.  She had no associated chest pain or shortness of breath.  She noted that her blood pressure was elevated despite taking her medications this morning.    She denies any numbness tingling weakness no headache.  No vision changes.  No abdominal pain nausea or vomiting.  Denies history of DVT or PE.    Currently her back pain has started to ease up.    PAST MEDICAL / SURGICAL / SOCIAL / FAMILY HISTORY      has a past medical history of Allergic rhinitis, Anxiety, Arthritis, Chronic back pain, Depression, Essential hypertension, Fall, Hyperlipidemia, Osteoarthritis, Restless legs syndrome, Sciatic pain, and Tobacco abuse.     has a past surgical history that includes Eye surgery (Left); fracture surgery (bilat wrist fx as a child); Tubal ligation (1985); and Patella surgery (Left, 12/17/15).    Social History     Socioeconomic History    Marital status:      Spouse name: Not on file    Number of children: Not on file    Years of education: Not on file    Highest education level: Not on file   Occupational History    Not on file   Tobacco Use    Smoking status: Former     Current

## 2024-06-28 ENCOUNTER — APPOINTMENT (OUTPATIENT)
Dept: GENERAL RADIOLOGY | Age: 64
End: 2024-06-28
Payer: COMMERCIAL

## 2024-06-28 VITALS
RESPIRATION RATE: 16 BRPM | HEIGHT: 62 IN | BODY MASS INDEX: 28.84 KG/M2 | SYSTOLIC BLOOD PRESSURE: 142 MMHG | DIASTOLIC BLOOD PRESSURE: 83 MMHG | OXYGEN SATURATION: 96 % | WEIGHT: 156.7 LBS | HEART RATE: 70 BPM | TEMPERATURE: 97.7 F

## 2024-06-28 PROBLEM — J96.01 ACUTE HYPOXIC RESPIRATORY FAILURE (HCC): Status: ACTIVE | Noted: 2024-06-28

## 2024-06-28 LAB
EKG ATRIAL RATE: 100 BPM
EKG DIAGNOSIS: NORMAL
EKG P AXIS: 45 DEGREES
EKG P-R INTERVAL: 140 MS
EKG Q-T INTERVAL: 346 MS
EKG QRS DURATION: 64 MS
EKG QTC CALCULATION (BAZETT): 446 MS
EKG R AXIS: 10 DEGREES
EKG T AXIS: 51 DEGREES
EKG VENTRICULAR RATE: 100 BPM

## 2024-06-28 PROCEDURE — 2700000000 HC OXYGEN THERAPY PER DAY

## 2024-06-28 PROCEDURE — G0378 HOSPITAL OBSERVATION PER HR: HCPCS

## 2024-06-28 PROCEDURE — 6360000002 HC RX W HCPCS: Performed by: FAMILY MEDICINE

## 2024-06-28 PROCEDURE — 2580000003 HC RX 258: Performed by: FAMILY MEDICINE

## 2024-06-28 PROCEDURE — 71045 X-RAY EXAM CHEST 1 VIEW: CPT

## 2024-06-28 PROCEDURE — 93010 ELECTROCARDIOGRAM REPORT: CPT | Performed by: INTERNAL MEDICINE

## 2024-06-28 PROCEDURE — 6370000000 HC RX 637 (ALT 250 FOR IP): Performed by: FAMILY MEDICINE

## 2024-06-28 PROCEDURE — 96372 THER/PROPH/DIAG INJ SC/IM: CPT

## 2024-06-28 PROCEDURE — 94761 N-INVAS EAR/PLS OXIMETRY MLT: CPT

## 2024-06-28 RX ORDER — TRAMADOL HYDROCHLORIDE 50 MG/1
50 TABLET ORAL EVERY 6 HOURS PRN
Status: DISCONTINUED | OUTPATIENT
Start: 2024-06-28 | End: 2024-06-28 | Stop reason: HOSPADM

## 2024-06-28 RX ORDER — AMLODIPINE BESYLATE 5 MG/1
5 TABLET ORAL DAILY
Status: DISCONTINUED | OUTPATIENT
Start: 2024-06-28 | End: 2024-06-28 | Stop reason: HOSPADM

## 2024-06-28 RX ORDER — PRAMIPEXOLE DIHYDROCHLORIDE 0.12 MG/1
0.12 TABLET ORAL NIGHTLY
Status: DISCONTINUED | OUTPATIENT
Start: 2024-06-28 | End: 2024-06-28 | Stop reason: HOSPADM

## 2024-06-28 RX ORDER — GUAIFENESIN 200 MG/10ML
200 LIQUID ORAL EVERY 4 HOURS PRN
Status: DISCONTINUED | OUTPATIENT
Start: 2024-06-28 | End: 2024-06-28 | Stop reason: HOSPADM

## 2024-06-28 RX ORDER — IPRATROPIUM BROMIDE AND ALBUTEROL SULFATE 2.5; .5 MG/3ML; MG/3ML
1 SOLUTION RESPIRATORY (INHALATION) EVERY 4 HOURS PRN
Status: DISCONTINUED | OUTPATIENT
Start: 2024-06-28 | End: 2024-06-28 | Stop reason: HOSPADM

## 2024-06-28 RX ORDER — SODIUM CHLORIDE 0.9 % (FLUSH) 0.9 %
5-40 SYRINGE (ML) INJECTION EVERY 12 HOURS SCHEDULED
Status: DISCONTINUED | OUTPATIENT
Start: 2024-06-28 | End: 2024-06-28 | Stop reason: HOSPADM

## 2024-06-28 RX ORDER — POLYETHYLENE GLYCOL 3350 17 G/17G
17 POWDER, FOR SOLUTION ORAL DAILY PRN
Status: DISCONTINUED | OUTPATIENT
Start: 2024-06-28 | End: 2024-06-28 | Stop reason: HOSPADM

## 2024-06-28 RX ORDER — ONDANSETRON 2 MG/ML
4 INJECTION INTRAMUSCULAR; INTRAVENOUS EVERY 6 HOURS PRN
Status: DISCONTINUED | OUTPATIENT
Start: 2024-06-28 | End: 2024-06-28 | Stop reason: HOSPADM

## 2024-06-28 RX ORDER — SODIUM CHLORIDE 0.9 % (FLUSH) 0.9 %
5-40 SYRINGE (ML) INJECTION PRN
Status: DISCONTINUED | OUTPATIENT
Start: 2024-06-28 | End: 2024-06-28 | Stop reason: HOSPADM

## 2024-06-28 RX ORDER — QUETIAPINE FUMARATE 100 MG/1
100 TABLET, FILM COATED ORAL NIGHTLY
Status: DISCONTINUED | OUTPATIENT
Start: 2024-06-28 | End: 2024-06-28 | Stop reason: HOSPADM

## 2024-06-28 RX ORDER — POTASSIUM CHLORIDE 20 MEQ/1
40 TABLET, EXTENDED RELEASE ORAL PRN
Status: DISCONTINUED | OUTPATIENT
Start: 2024-06-28 | End: 2024-06-28 | Stop reason: HOSPADM

## 2024-06-28 RX ORDER — ATORVASTATIN CALCIUM 10 MG/1
10 TABLET, FILM COATED ORAL DAILY
Status: DISCONTINUED | OUTPATIENT
Start: 2024-06-28 | End: 2024-06-28

## 2024-06-28 RX ORDER — MAGNESIUM SULFATE IN WATER 40 MG/ML
2000 INJECTION, SOLUTION INTRAVENOUS PRN
Status: DISCONTINUED | OUTPATIENT
Start: 2024-06-28 | End: 2024-06-28 | Stop reason: HOSPADM

## 2024-06-28 RX ORDER — PREDNISONE 20 MG/1
40 TABLET ORAL DAILY
Status: DISCONTINUED | OUTPATIENT
Start: 2024-06-28 | End: 2024-06-28 | Stop reason: HOSPADM

## 2024-06-28 RX ORDER — MELOXICAM 7.5 MG/1
15 TABLET ORAL DAILY
Status: DISCONTINUED | OUTPATIENT
Start: 2024-06-28 | End: 2024-06-28 | Stop reason: HOSPADM

## 2024-06-28 RX ORDER — ACETAMINOPHEN 650 MG/1
650 SUPPOSITORY RECTAL EVERY 6 HOURS PRN
Status: DISCONTINUED | OUTPATIENT
Start: 2024-06-28 | End: 2024-06-28 | Stop reason: HOSPADM

## 2024-06-28 RX ORDER — ACETAMINOPHEN 325 MG/1
650 TABLET ORAL EVERY 6 HOURS PRN
Status: DISCONTINUED | OUTPATIENT
Start: 2024-06-28 | End: 2024-06-28 | Stop reason: HOSPADM

## 2024-06-28 RX ORDER — ATORVASTATIN CALCIUM 10 MG/1
10 TABLET, FILM COATED ORAL NIGHTLY
Status: DISCONTINUED | OUTPATIENT
Start: 2024-06-28 | End: 2024-06-28 | Stop reason: HOSPADM

## 2024-06-28 RX ORDER — SODIUM CHLORIDE 9 MG/ML
INJECTION, SOLUTION INTRAVENOUS PRN
Status: DISCONTINUED | OUTPATIENT
Start: 2024-06-28 | End: 2024-06-28 | Stop reason: HOSPADM

## 2024-06-28 RX ORDER — ONDANSETRON 4 MG/1
4 TABLET, ORALLY DISINTEGRATING ORAL EVERY 8 HOURS PRN
Status: DISCONTINUED | OUTPATIENT
Start: 2024-06-28 | End: 2024-06-28 | Stop reason: HOSPADM

## 2024-06-28 RX ORDER — ENOXAPARIN SODIUM 100 MG/ML
40 INJECTION SUBCUTANEOUS DAILY
Status: DISCONTINUED | OUTPATIENT
Start: 2024-06-28 | End: 2024-06-28 | Stop reason: HOSPADM

## 2024-06-28 RX ORDER — POTASSIUM CHLORIDE 7.45 MG/ML
10 INJECTION INTRAVENOUS PRN
Status: DISCONTINUED | OUTPATIENT
Start: 2024-06-28 | End: 2024-06-28 | Stop reason: HOSPADM

## 2024-06-28 RX ADMIN — SODIUM CHLORIDE, PRESERVATIVE FREE 10 ML: 5 INJECTION INTRAVENOUS at 08:19

## 2024-06-28 RX ADMIN — PREDNISONE 40 MG: 20 TABLET ORAL at 08:19

## 2024-06-28 RX ADMIN — QUETIAPINE FUMARATE 100 MG: 100 TABLET ORAL at 01:35

## 2024-06-28 RX ADMIN — MELOXICAM 15 MG: 7.5 TABLET ORAL at 08:18

## 2024-06-28 RX ADMIN — TRAMADOL HYDROCHLORIDE 50 MG: 50 TABLET ORAL at 08:18

## 2024-06-28 RX ADMIN — PRAMIPEXOLE DIHYDROCHLORIDE 0.12 MG: 0.12 TABLET ORAL at 01:35

## 2024-06-28 RX ADMIN — AMLODIPINE BESYLATE 5 MG: 5 TABLET ORAL at 08:18

## 2024-06-28 RX ADMIN — ENOXAPARIN SODIUM 40 MG: 100 INJECTION SUBCUTANEOUS at 08:19

## 2024-06-28 ASSESSMENT — PAIN DESCRIPTION - ORIENTATION: ORIENTATION: LOWER

## 2024-06-28 ASSESSMENT — PAIN DESCRIPTION - PAIN TYPE: TYPE: CHRONIC PAIN

## 2024-06-28 ASSESSMENT — PAIN DESCRIPTION - ONSET: ONSET: ON-GOING

## 2024-06-28 ASSESSMENT — PAIN DESCRIPTION - FREQUENCY: FREQUENCY: CONTINUOUS

## 2024-06-28 ASSESSMENT — PAIN SCALES - GENERAL
PAINLEVEL_OUTOF10: 6
PAINLEVEL_OUTOF10: 0
PAINLEVEL_OUTOF10: 4

## 2024-06-28 ASSESSMENT — PAIN DESCRIPTION - LOCATION: LOCATION: BACK

## 2024-06-28 ASSESSMENT — PAIN - FUNCTIONAL ASSESSMENT: PAIN_FUNCTIONAL_ASSESSMENT: PREVENTS OR INTERFERES SOME ACTIVE ACTIVITIES AND ADLS

## 2024-06-28 ASSESSMENT — PAIN DESCRIPTION - DESCRIPTORS: DESCRIPTORS: ACHING

## 2024-06-28 NOTE — ED NOTES
Pt stated she needs to go to the bathroom. She got up out of bed and walked to and from the bathroom on room air with the Respiratory Therapist and her O2 sats dropped down to around 71% on room air. Dr. Weston was made aware of this. Pt was assisted back into bed and placed on O2 3 Lt NC. Her respirations are unlabored and her skin is pink, warm, and dry. She is alert & oriented x 4. She currently denies any needs and/or concerns. Will continue to monitor her closely.

## 2024-06-28 NOTE — PLAN OF CARE
Problem: Pain  Goal: Verbalizes/displays adequate comfort level or baseline comfort level  6/28/2024 0842 by Allyson Leonard, RN  Outcome: Progressing  6/28/2024 0250 by Vane Hall, RN  Outcome: Progressing     Problem: Discharge Planning  Goal: Discharge to home or other facility with appropriate resources  6/28/2024 0842 by Allyson Leonard, RN  Outcome: Progressing  6/28/2024 0250 by Vane Hall, RN  Outcome: Progressing

## 2024-06-28 NOTE — PLAN OF CARE
Problem: Pain  Goal: Verbalizes/displays adequate comfort level or baseline comfort level  6/28/2024 1550 by Allyson Leonard RN  Outcome: Completed  6/28/2024 0842 by Allyson Leonard RN  Outcome: Progressing  6/28/2024 0250 by Vane Hall, RN  Outcome: Progressing     Problem: Discharge Planning  Goal: Discharge to home or other facility with appropriate resources  6/28/2024 1550 by Allyson Leonard RN  Outcome: Completed  6/28/2024 0842 by Allyson Leonard RN  Outcome: Progressing  6/28/2024 0250 by Vane Hall RN  Outcome: Progressing

## 2024-06-28 NOTE — PROGRESS NOTES
Discharge instructions explained to pt, no questions or concerns at this time. Peripheral IV removed. Pt left at 1610. Prescriptions sent to pharmacy. Pt left with home oxygen .

## 2024-06-28 NOTE — PROGRESS NOTES
Patient evaluated for home O2 requirements.  Patient completed walking test.  Patient was also noted to desat to 86% on room air, saturations improved when placed on 2 L per nasal cannula.  Patient admitted with acute hypoxia, suspect underlying COPD undiagnosed. CTA chest 6/27 indicates interstitial lung disease with diffuse fibrosis. Patient to follow-up with pulmonary outpatient to complete COPD diagnosis.  Will require home O2 at time of discharge.    Electronically signed by MADY Muñoz NP on 6/28/2024 at 1:57 PM

## 2024-06-28 NOTE — PROGRESS NOTES
4 Eyes Skin Assessment     NAME:  Alondra Ho  YOB: 1960  MEDICAL RECORD NUMBER:  [id number]    The patient is being assessed for  Admission    I agree that at least one RN has performed a thorough Head to Toe Skin Assessment on the patient. ALL assessment sites listed below have been assessed.      Areas assessed by both nurses:    Head, Face, Ears, Shoulders, Back, Chest, Arms, Elbows, Hands, Sacrum. Buttock, Coccyx, Ischium, and Legs. Feet and Heels        Does the Patient have a Wound? No noted wound(s)       Les Prevention initiated by RN: No  Wound Care Orders initiated by RN: No    Pressure Injury (Stage 3,4, Unstageable, DTI, NWPT, and Complex wounds) if present, place Wound referral order by RN under : No    New Ostomies, if present place, Ostomy referral order under : No     Nurse 1 eSignature: Electronically signed by Vane Hall RN on 6/28/24 at 7:44 AM EDT    **SHARE this note so that the co-signing nurse can place an eSignature**    Nurse 2 eSignature: Electronically signed by Joaquin Jean RN on 6/28/24 at 10:46 PM EDT

## 2024-06-28 NOTE — ED NOTES
Patient ambulated to bathroom on room air approx. 30 ft to evaluate oximetry with Sp02 dropping to 69-71% with -112 bpm.  Patient placed back on 02 at 3lpm upon return to room with Sp02 quickly recovering to 98% on cannula at 3lpm.  HR now 76 and RR 17-20 bpm with even and regular respirations. CHET Escal[place] and Dr Weston aware and RT will follow and titrate 02 as needed.

## 2024-06-28 NOTE — CARE COORDINATION
CM reviewed the patient's chart to initiate discharge planning.  Patient is from home with family, has medical coverage & PCP, and is independent with ADLs.  Patient did not require the use of any assistive devices or home oxygen prior to admission.  A home oxygen evaluation may be needed prior to discharge if patient unable to be weaned off supplemental oxygen during hospitalization.  Plan is for the patient to return home upon discharge and no other needs have been identified at this time.  CM available if needs arise.

## 2024-06-28 NOTE — ED NOTES
Pt was informed that she is going to be admitted upstairs to the Inpatient Unit Rm # 7 and she voiced understanding of this.

## 2024-06-28 NOTE — ED NOTES
Called report on Pt to Inpatient Unit RN, Vane. Pt's condition is currently stable- she is still alert & oriented x 4, her respirations are easy & unlabored on O2 1.5 Lt and her O2 sats are 92%. Her skin is pink, warm, and dry. She appears to be stable and in no acute distress. Pt transported upstairs to the Inpatient Unit Rm # 7 per ER bed on cardiac monitor and O2 1.5 Lt NC per Respiratory Therapist's assist. Pt had all of her belongings with her when she was transported upstairs.

## 2024-06-28 NOTE — PROGRESS NOTES
RESP CARE HOME 02 EVAL: PT ON ROOM AIR @ REST Sa02 86% PLACED BACK ON 2LNC. WILL SET UP 02 WITH [facility]

## 2024-06-28 NOTE — PROGRESS NOTES
I personally saw the patient and have reviewed all lab and imaging. I discussed the patient with the SAMANTHA and was available for questions and consultation as needed.     History:   Felt that she was having some pain in her shoulders. SBP was in 170's at home. She also had shortness of breat. She was found to have low o2 levels in the ER. She does not wear oxygen at home. She state she smoked for 41 years, but has quit over 1 year ago. Also quit marijuana recently.     Exam:   No acute distress  S1s2 rrr  May have some rales in bases but mostly clear  Abd soft nt nd  No pitting edema    MDM:   ***      Roula Headley MD  6/28/24

## 2024-06-28 NOTE — H&P
V2.0  History and Physical      Name:  Alondra Ho /Age/Sex: 1960  (63 y.o. female)   MRN & CSN:  [id number] & [id number] Encounter Date/Time: 2024 12:33 AM EDT   Location:   PCP: Grzegorz Quach MD       Hospital Day: 2    Assessment and Plan:   Alondra Ho is a 63 y.o. female with a pmh of anxiety, DDD, former cigarette smoker, HTN, and HLD who presents with acute hypoxic respiratory failure.         Plan:  Acute Hypoxic Respiratory failure  -Does not wear 02 at home. Arrived to ED with initial sats of 71%. Unable to wean at this time. Requires 1.5L at this time  -Recently quit smoking marijuana 2 weeks ago. Quit smoking cigarettes 1.5 yrs ago  -Duonebs q4 prn  -Prednisone 40mg po daily x 5 days total  -Attempt to wean again in the morning and discharge when sats maintain >90% on RA  -Will need outpt f/u PFT due to 41yr hx of 1PPD cigarette smoking to further evaluate for presence of COPD    DDD  -Chronic.Confirmed on x ray of lumbar spine on 23. Likely multilevel. Ran out of tramadol at home  -Will resume prn tramadol  -CT chest/ab/pelvis neg for aortic dissection  -Continue home meloxicam and pramipexole    HTN  -Continue amlodipine    HLD  -Substitute Simvastatin with atorvastatin during hospitalization    Anxiety  -Denies thoughts of harming self or others  -Continue seroquel at night    After the exam, another 16 minutes was spent discussing ACP with the patient. She states she wishes to remain a full code at this time.     Disposition:   Current Living situation: home  Expected Disposition: home  Estimated D/C: 1 day    Diet Regular adult   DVT Prophylaxis [x] Lovenox, []  Heparin, [] SCDs, [] Ambulation,  [] Eliquis, [] Xarelto, [] Coumadin   Code Status Full Code   Surrogate Decision Maker/ POA Grandson     Personally reviewed Lab Studies and Imaging     Discussed management of the case with Dr. Jay who recommended admitting the pt into observation status due to oxygen  Eye surgery (Left); fracture surgery (bilat wrist fx as a child); Tubal ligation (1985); and Patella surgery (Left, 12/17/15).  Allergies:   Allergies   Allergen Reactions    Flexeril [Cyclobenzaprine] Other (See Comments)     Increases irritability    Lisinopril Other (See Comments)     cough     Fam HX:  family history includes COPD in her father; Cancer in her mother; Diabetes in her brother, brother, and brother; Glaucoma in her brother; Heart Disease in her father; High Blood Pressure in her mother and sister; Kidney Disease in her brother; Mental Retardation in her brother; Migraines in her daughter; Stroke in her father.  Soc HX:   Social History     Socioeconomic History    Marital status:      Spouse name: None    Number of children: None    Years of education: None    Highest education level: None   Tobacco Use    Smoking status: Former     Current packs/day: 1.00     Average packs/day: 1 pack/day for 30.0 years (30.0 ttl pk-yrs)     Types: Cigarettes    Smokeless tobacco: Never   Vaping Use    Vaping Use: Never used   Substance and Sexual Activity    Alcohol use: No    Drug use: Yes     Types: Marijuana (Weed)    Sexual activity: Yes     Partners: Male       Medications:   Medications:    albuterol          Infusions:    niCARdipine       PRN Meds: albuterol, ,         Labs      CBC:   Recent Labs     06/27/24  1836   WBC 8.5   HGB 15.3        BMP:    Recent Labs     06/27/24  1800      K 3.8      CO2 21   BUN 7   CREATININE 0.6   GLUCOSE 113*     Hepatic: No results for input(s): \"AST\", \"ALT\", \"BILITOT\", \"ALKPHOS\" in the last 72 hours.    Invalid input(s): \"ALB\"  Lipids:   Lab Results   Component Value Date/Time    CHOL 190 10/19/2017 09:20 AM    HDL 43 10/19/2017 09:20 AM    TRIG 138 10/19/2017 09:20 AM     Hemoglobin A1C:   Lab Results   Component Value Date/Time    LABA1C 5.0 10/19/2017 09:23 AM     TSH:   Lab Results   Component Value Date/Time    TSH 0.46 10/19/2017 09:20

## 2024-06-28 NOTE — DISCHARGE SUMMARY
V2.0  Discharge Summary    Name:  Alondra Ho /Age/Sex: 1960 (63 y.o. female)   Admit Date: 2024  Discharge Date: 24    MRN & CSN:  [id number] & [id number] Encounter Date and Time 24 2:52 PM EDT    Attending:  Roula Headley MD Discharging Provider: MADY Muñoz - NP       Hospital Course:     Brief HPI: Alondra Ho is a 63 y.o. female who presented with acute hypoxic respiratory failure.     Brief Problem Based Course:     Acute Hypoxic Respiratory failure  -Does not wear 02 at home. Arrived to ED with initial sats of 71%. Unable to wean at this time. Requires 1.5L at this time  -CT chest with diffuse fibrosis suggestive of interstitial lung disease.  -Home O2 evaluation completed by respiratory therapy.  Patient does qualify for home oxygen.  -Recently quit smoking marijuana 2 weeks ago. Quit smoking cigarettes 1.5 yrs ago  -Duonebs q4 prn  -Prednisone 40mg po daily x 5 days total  -Attempt to wean again in the morning and discharge when sats maintain >90% on RA  -Will need outpt f/u with pulmonology PFT due to 41yr hx of 1PPD cigarette smoking to further evaluate for presence of COPD     DDD  -Chronic.Confirmed on x ray of lumbar spine on 23. Likely multilevel. Ran out of tramadol at home  -Will resume prn tramadol  -CT chest/ab/pelvis neg for aortic dissection  -Continue home meloxicam and pramipexole     HTN  -Continue amlodipine     HLD  -Substitute Simvastatin with atorvastatin during hospitalization     Anxiety  -Denies thoughts of harming self or others  -Continue seroquel at night      The patient expressed appropriate understanding of, and agreement with the discharge recommendations, medications, and plan.     Consults this admission:  IP CONSULT TO HOSPITALIST    Discharge Diagnosis:   Acute hypoxic respiratory failure (HCC)  Interstitial lung disease  Degenerative disc disease  Hypertension  Hyperlipidemia  Anxiety    Discharge Instruction:   Follow up

## 2024-06-28 NOTE — PROGRESS NOTES
Pt removed from oxygen and placed on room air, while in bed pt's O2 sat dropped to 86%. Pt placed back on 1 L NC.     Martin FRYE notified and aware.

## 2024-06-28 NOTE — ED PROVIDER NOTES
Alondra Ho was checked out to me by Dr. Auguste. Please see his/her initial documentation for details of the patient's ED presentation, physical exam and completed studies.    In brief, Alondra Ho is a 63 y.o. female that presents with back pain and HTN.    Labs  Results for orders placed or performed during the hospital encounter of 06/27/24   BMP   Result Value Ref Range    Sodium 141 135 - 145 MMOL/L    Potassium 3.8 3.5 - 5.1 MMOL/L    Chloride 103 99 - 110 mMol/L    CO2 21 21 - 32 MMOL/L    Anion Gap 17 (H) 7 - 16    Glucose 113 (H) 70 - 99 MG/DL    BUN 7 6 - 23 MG/DL    Creatinine 0.6 0.6 - 1.1 MG/DL    Est, Glom Filt Rate >90 >60 mL/min/1.73m2    Calcium 10.1 8.3 - 10.6 MG/DL   Troponin   Result Value Ref Range    Troponin, High Sensitivity 12 0 - 14 ng/L   Brain Natriuretic Peptide   Result Value Ref Range    Pro-.8 (H) <300 PG/ML   CBC with Auto Differential   Result Value Ref Range    WBC 8.5 4.0 - 10.5 K/CU MM    RBC 5.24 4.2 - 5.4 M/CU MM    Hemoglobin 15.3 12.5 - 16.0 GM/DL    Hematocrit 46.8 37 - 47 %    MCV 89.3 78 - 100 FL    MCH 29.2 27 - 31 PG    MCHC 32.7 32.0 - 36.0 %    RDW 12.7 11.7 - 14.9 %    Platelets 232 140 - 440 K/CU MM    MPV 10.4 7.5 - 11.1 FL    Differential Type AUTOMATED DIFFERENTIAL     Neutrophils % 72.2 (H) 36 - 66 %    Lymphocytes % 16.4 (L) 24 - 44 %    Monocytes % 5.9 (H) 0 - 4 %    Eosinophils % 4.5 (H) 0 - 3 %    Basophils % 0.8 0 - 1 %    Neutrophils Absolute 6.1 K/CU MM    Lymphocytes Absolute 1.4 K/CU MM    Monocytes Absolute 0.5 K/CU MM    Eosinophils Absolute 0.4 K/CU MM    Basophils Absolute 0.1 K/CU MM    Immature Neutrophil % 0.2 0 - 0.43 %    Total Immature Neutrophil 0.02 K/CU MM   Arterial Blood Gas MMH Only   Result Value Ref Range    pH, Bld 7.34 (L) 7.35 - 7.45    pCO2, Arterial 47.3 35 - 48 MMHG    pO2, Arterial 43.3 (L) 83 - 108 MMHG    Base Exc, Mixed 1.1 0 - 3.0    Base Excess MINUS 0 - 3.0    HCO3, Arterial 25.4 21 - 28 MMOL/L    CO2 Content  26.8 21 - 32 MMOL/L    O2 Sat 75.4 (L) 94 - 98 %   EKG 12 Lead   Result Value Ref Range    Ventricular Rate 100 BPM    Atrial Rate 100 BPM    P-R Interval 140 ms    QRS Duration 64 ms    Q-T Interval 346 ms    QTc Calculation (Bazett) 446 ms    P Axis 45 degrees    R Axis 10 degrees    T Axis 51 degrees    Diagnosis       Normal sinus rhythm  Possible Left atrial enlargement  Nonspecific ST and T wave abnormality  Abnormal ECG  When compared with ECG of 15-OCT-2022 14:22,  Vent. rate has increased BY  39 BPM  Questionable change in QRS duration         CTA CHEST ABDOMEN PELVIS W CONTRAST    Result Date: 6/27/2024  EXAMINATION: CTA CHEST ABDOMEN PELVIS W CONTRAST 6/27/2024 8:14 PM ACCESSION NUMBER: [id number] COMPARISON: None available INDICATION: sudden onset mid thoracic back pain.  significantly elevated BP and hypoxia.  r/o Dissection / PE TECHNIQUE: Multiple contiguous axial CT images of the chest, abdomen, and pelvis were obtained from the lung apices to the symphysis pubis after the intravenous administration of 100mL Iso-alma 370.  Reformats are provided. Radiation dose reduction techniques were used for this study. Our CT scanners use one or all of the following: Automated exposure control, adjustment of the mA and/or kV according to patient size, iterative reconstruction. FINDINGS: AIRWAYS: The central airways are patent. LUNGS: Diffuse bilateral fibrotic disease with peripheral honeycombing in the lung bases. No suspicious pulmonary nodules. PLEURA: No pleural effusion or pneumothorax. HEART: The heart is not enlarged. No calcified coronary atherosclerosis.  No pericardial effusion. THORACIC AORTA: The aorta is normal in caliber. PULMONARY ARTERY: The main pulmonary artery is normal in caliber. MEDIASTINUM/ARTHUR: Bilateral hilar adenopathy. CHEST WALL: No mass or axillary lymphadenopathy. LIVER: The liver contour is normal. No suspicious liver lesion. BILIARY TREE: Gallstones. No biliary dilation. SPLEEN:

## 2024-07-10 ENCOUNTER — OFFICE VISIT (OUTPATIENT)
Dept: PULMONOLOGY | Age: 64
End: 2024-07-10
Payer: COMMERCIAL

## 2024-07-10 VITALS
SYSTOLIC BLOOD PRESSURE: 128 MMHG | BODY MASS INDEX: 30.36 KG/M2 | OXYGEN SATURATION: 97 % | WEIGHT: 165 LBS | DIASTOLIC BLOOD PRESSURE: 62 MMHG | HEART RATE: 89 BPM | HEIGHT: 62 IN

## 2024-07-10 DIAGNOSIS — F12.90 MARIJUANA USE: ICD-10-CM

## 2024-07-10 DIAGNOSIS — J96.01 ACUTE HYPOXIC RESPIRATORY FAILURE (HCC): ICD-10-CM

## 2024-07-10 DIAGNOSIS — J84.9 ILD (INTERSTITIAL LUNG DISEASE) (HCC): ICD-10-CM

## 2024-07-10 DIAGNOSIS — J44.9 CHRONIC OBSTRUCTIVE PULMONARY DISEASE, UNSPECIFIED COPD TYPE (HCC): Primary | ICD-10-CM

## 2024-07-10 DIAGNOSIS — R09.02 HYPOXEMIA: ICD-10-CM

## 2024-07-10 DIAGNOSIS — Z72.0 TOBACCO ABUSE: ICD-10-CM

## 2024-07-10 PROCEDURE — 3078F DIAST BP <80 MM HG: CPT | Performed by: NURSE PRACTITIONER

## 2024-07-10 PROCEDURE — 99204 OFFICE O/P NEW MOD 45 MIN: CPT | Performed by: NURSE PRACTITIONER

## 2024-07-10 PROCEDURE — 3074F SYST BP LT 130 MM HG: CPT | Performed by: NURSE PRACTITIONER

## 2024-07-10 RX ORDER — TRAMADOL HYDROCHLORIDE 50 MG/1
50 TABLET ORAL EVERY 8 HOURS PRN
COMMUNITY
Start: 2024-04-25

## 2024-07-10 RX ORDER — ALBUTEROL SULFATE 2.5 MG/3ML
2.5 SOLUTION RESPIRATORY (INHALATION) 4 TIMES DAILY PRN
Qty: 120 EACH | Refills: 3 | Status: SHIPPED | OUTPATIENT
Start: 2024-07-10

## 2024-07-10 RX ORDER — CARISOPRODOL 350 MG/1
350 TABLET ORAL 4 TIMES DAILY PRN
COMMUNITY
Start: 2024-06-20 | End: 2024-07-20

## 2024-07-10 ASSESSMENT — SLEEP AND FATIGUE QUESTIONNAIRES
HOW LIKELY ARE YOU TO NOD OFF OR FALL ASLEEP IN A CAR, WHILE STOPPED FOR A FEW MINUTES IN TRAFFIC: WOULD NEVER DOZE
HOW LIKELY ARE YOU TO NOD OFF OR FALL ASLEEP WHILE SITTING AND TALKING TO SOMEONE: WOULD NEVER DOZE
ESS TOTAL SCORE: 2
HOW LIKELY ARE YOU TO NOD OFF OR FALL ASLEEP WHILE LYING DOWN TO REST IN THE AFTERNOON WHEN CIRCUMSTANCES PERMIT: MODERATE CHANCE OF DOZING
HOW LIKELY ARE YOU TO NOD OFF OR FALL ASLEEP WHILE SITTING AND READING: WOULD NEVER DOZE
HOW LIKELY ARE YOU TO NOD OFF OR FALL ASLEEP WHILE SITTING INACTIVE IN A PUBLIC PLACE: WOULD NEVER DOZE
HOW LIKELY ARE YOU TO NOD OFF OR FALL ASLEEP WHILE SITTING QUIETLY AFTER LUNCH WITHOUT ALCOHOL: WOULD NEVER DOZE
HOW LIKELY ARE YOU TO NOD OFF OR FALL ASLEEP WHILE WATCHING TV: WOULD NEVER DOZE
HOW LIKELY ARE YOU TO NOD OFF OR FALL ASLEEP WHEN YOU ARE A PASSENGER IN A CAR FOR AN HOUR WITHOUT A BREAK: WOULD NEVER DOZE

## 2024-07-10 ASSESSMENT — ENCOUNTER SYMPTOMS: SHORTNESS OF BREATH: 1

## 2024-07-10 NOTE — PATIENT INSTRUCTIONS
Use Yupelri solution once in the mornings for 7 days in your nebulizer. DO NOT mix it with Albuterol.     Use Albuterol solution in your nebulizer once every morning and then  every 4 to 6 hours if needed throughout the day  for wheezing and shortness of breath.      Scheduling will call you for a Pulmonary function Testing and to do a High Resolution Cat Scan of the lungs.     Advice to purchase a pulse oximeter probe.at [facility].   Please check you oxygen saturation regularly. Do not go below 90%     Go to ED if oxygen is too low and will not recover while using oxygen on 2 liters or call 911 .

## 2024-07-10 NOTE — PROGRESS NOTES
05/24/2016    Muscle spasm of left lower extremity 05/24/2016    Muscle spasms of both lower extremities 07/08/2016    Elevated LDL cholesterol level 07/29/2016    Insomnia 09/30/2016    Sciatic pain 11/29/2016    Numbness of fingers of both hands 09/19/2017    Chronic right shoulder pain 09/19/2017    Arthritis 01/11/2018    Fall 01/11/2018     Past Medical History:   Diagnosis Date    Anxiety     Chronic back pain     Depression     Hyperlipidemia 10/28/2016     Current Outpatient Medications on File Prior to Visit   Medication Sig Dispense Refill    carisoprodol (SOMA) 350 MG tablet Take 1 tablet by mouth 4 times daily as needed for Muscle spasms.      traMADol (ULTRAM) 50 MG tablet Take 1 tablet by mouth every 8 hours as needed for Pain.      meloxicam (MOBIC) 7.5 MG tablet Take 1 tablet by mouth daily 90 tablet 1    amLODIPine (NORVASC) 5 MG tablet Take 1 tablet by mouth daily 90 tablet 1    simvastatin (ZOCOR) 20 MG tablet Take 1 tablet by mouth nightly 90 tablet 1    QUEtiapine (SEROQUEL) 50 MG tablet Take 1-2 tablets by mouth nightly 90 tablet 1    pramipexole (MIRAPEX) 0.125 MG tablet Take 1 tablet by mouth nightly 90 tablet 1    albuterol sulfate HFA (VENTOLIN HFA) 108 (90 Base) MCG/ACT inhaler Inhale 2 puffs into the lungs 4 times daily as needed for Wheezing 54 g 1    celecoxib (CELEBREX) 100 MG capsule Take 1 capsule by mouth 2 times daily 60 capsule 3    HYDROcodone-acetaminophen (NORCO) 5-325 MG per tablet  (Patient not taking: Reported on 7/10/2024)  0     No current facility-administered medications on file prior to visit.       Tobacco Use      Smoking status: Former        Packs/day: 1.00        Years: 1 pack/day for 30.0 years (30.0 ttl pk-yrs)        Types: Cigarettes      Smokeless tobacco: Never     Chest X-ray     INDICATION: Hypoxia     COMPARISON: Reviewed     TECHNIQUE: AP/PA view of the chest was obtained.     FINDINGS: New interstitial airspace opacities noted diffusely..  The heart

## 2024-07-24 ENCOUNTER — TELEPHONE (OUTPATIENT)
Dept: PULMONOLOGY | Age: 64
End: 2024-07-24

## 2024-07-24 NOTE — TELEPHONE ENCOUNTER
Rec'd call from the Prior Auth Dept letting us know that the CT that was ordered for this patient has been denied. It has been denied because there has been no PFT done yet and no evidence of treatment after the PFT.  Appeal can be made by calling Brenda [phone]  Case# [id number]

## 2024-08-27 ENCOUNTER — HOSPITAL ENCOUNTER (OUTPATIENT)
Dept: PULMONOLOGY | Age: 64
Discharge: HOME | End: 2024-08-27

## 2024-08-27 NOTE — PROGRESS NOTES
Pt did NOT show for her appointment today.  Called yesterday to remind pt of appointment date and time.

## 2024-10-11 DIAGNOSIS — J84.9 ILD (INTERSTITIAL LUNG DISEASE) (HCC): Primary | ICD-10-CM

## 2024-10-11 NOTE — PROGRESS NOTES
I spoke with the patient concerning getting a PFT done so that she can start on a bronchodilator or get treatment in case of ILD. She lives in [place] and has transportation issues. She can have the PFT done in [place] at [facility]. Central scheduling will call her and she will be given the number to call [place] PFT lab and get scheduled for testing.  Patient agrees with the plan.

## 2024-12-03 ENCOUNTER — HOSPITAL ENCOUNTER (OUTPATIENT)
Dept: CARDIAC REHAB | Age: 64
Discharge: HOME | End: 2024-12-03
Payer: COMMERCIAL

## 2024-12-03 DIAGNOSIS — J84.9 ILD (INTERSTITIAL LUNG DISEASE) (HCC): ICD-10-CM

## 2024-12-03 PROCEDURE — 94729 DIFFUSING CAPACITY: CPT

## 2024-12-03 PROCEDURE — 94727 GAS DIL/WSHOT DETER LNG VOL: CPT

## 2024-12-03 PROCEDURE — 94060 EVALUATION OF WHEEZING: CPT

## 2025-01-13 ENCOUNTER — TELEPHONE (OUTPATIENT)
Dept: PULMONOLOGY | Age: 65
End: 2025-01-13

## 2025-01-13 ENCOUNTER — APPOINTMENT (OUTPATIENT)
Dept: CT IMAGING | Age: 65
End: 2025-01-13
Payer: COMMERCIAL

## 2025-01-13 ENCOUNTER — HOSPITAL ENCOUNTER (INPATIENT)
Age: 65
LOS: 5 days | Discharge: HOME HEALTH | End: 2025-01-18
Attending: EMERGENCY MEDICINE | Admitting: INTERNAL MEDICINE
Payer: COMMERCIAL

## 2025-01-13 ENCOUNTER — APPOINTMENT (OUTPATIENT)
Dept: GENERAL RADIOLOGY | Age: 65
End: 2025-01-13
Payer: COMMERCIAL

## 2025-01-13 DIAGNOSIS — J96.01 ACUTE RESPIRATORY FAILURE WITH HYPOXIA: Primary | ICD-10-CM

## 2025-01-13 DIAGNOSIS — I50.9 ACUTE CONGESTIVE HEART FAILURE, UNSPECIFIED HEART FAILURE TYPE (HCC): ICD-10-CM

## 2025-01-13 DIAGNOSIS — F41.9 ANXIETY: ICD-10-CM

## 2025-01-13 DIAGNOSIS — I50.9 ACUTE ON CHRONIC CONGESTIVE HEART FAILURE, UNSPECIFIED HEART FAILURE TYPE (HCC): ICD-10-CM

## 2025-01-13 DIAGNOSIS — R06.02 SHORTNESS OF BREATH: ICD-10-CM

## 2025-01-13 DIAGNOSIS — J90 PLEURAL EFFUSION: ICD-10-CM

## 2025-01-13 PROBLEM — J96.21 ACUTE ON CHRONIC RESPIRATORY FAILURE WITH HYPOXIA: Status: ACTIVE | Noted: 2025-01-13

## 2025-01-13 LAB
ANION GAP SERPL CALCULATED.3IONS-SCNC: 15 MMOL/L (ref 9–17)
BASOPHILS # BLD: 0.07 K/UL
BASOPHILS NFR BLD: 1 % (ref 0–1)
BNP SERPL-MCNC: 6895 PG/ML (ref 0–125)
BUN SERPL-MCNC: 13 MG/DL (ref 7–20)
CALCIUM SERPL-MCNC: 9 MG/DL (ref 8.3–10.6)
CHLORIDE SERPL-SCNC: 99 MMOL/L (ref 99–110)
CO2 SERPL-SCNC: 21 MMOL/L (ref 21–32)
CREAT SERPL-MCNC: 1 MG/DL (ref 0.6–1.2)
EOSINOPHIL # BLD: 0.02 K/UL
EOSINOPHILS RELATIVE PERCENT: 0 % (ref 0–3)
ERYTHROCYTE [DISTWIDTH] IN BLOOD BY AUTOMATED COUNT: 17.2 % (ref 11.7–14.9)
GFR, ESTIMATED: 58 ML/MIN/1.73M2
GLUCOSE SERPL-MCNC: 125 MG/DL (ref 74–99)
HCT VFR BLD AUTO: 51.8 % (ref 37–47)
HGB BLD-MCNC: 16.1 G/DL (ref 12.5–16)
IMM GRANULOCYTES # BLD AUTO: 0.06 K/UL
IMM GRANULOCYTES NFR BLD: 1 %
INR PPP: 1.5
IRON SATN MFR SERPL: 8 % (ref 15–50)
IRON SERPL-MCNC: 28 UG/DL (ref 37–145)
LDH SERPL-CCNC: 319 U/L (ref 100–190)
LYMPHOCYTES NFR BLD: 0.92 K/UL
LYMPHOCYTES RELATIVE PERCENT: 9 % (ref 24–44)
MCH RBC QN AUTO: 28 PG (ref 27–31)
MCHC RBC AUTO-ENTMCNC: 31.1 G/DL (ref 32–36)
MCV RBC AUTO: 90.2 FL (ref 78–100)
MONOCYTES NFR BLD: 0.87 K/UL
MONOCYTES NFR BLD: 8 % (ref 0–4)
NEUTROPHILS NFR BLD: 82 % (ref 36–66)
NEUTS SEG NFR BLD: 8.67 K/UL
PLATELET # BLD AUTO: 208 K/UL (ref 140–440)
PMV BLD AUTO: 10.5 FL (ref 7.5–11.1)
POTASSIUM SERPL-SCNC: 3.7 MMOL/L (ref 3.5–5.1)
PROT SERPL-MCNC: 6.5 G/DL (ref 6.4–8.2)
PROTHROMBIN TIME: 18.4 SEC (ref 11.7–14.5)
RBC # BLD AUTO: 5.74 M/UL (ref 4.2–5.4)
SODIUM SERPL-SCNC: 135 MMOL/L (ref 136–145)
TIBC SERPL-MCNC: 332 UG/DL (ref 260–445)
TROPONIN I SERPL HS-MCNC: 28 NG/L (ref 0–14)
TROPONIN I SERPL HS-MCNC: 28 NG/L (ref 0–14)
TSH SERPL DL<=0.05 MIU/L-ACNC: 0.35 UIU/ML (ref 0.27–4.2)
UNSATURATED IRON BINDING CAPACITY: 304 UG/DL (ref 110–370)
WBC OTHER # BLD: 10.6 K/UL (ref 4–10.5)

## 2025-01-13 PROCEDURE — 84484 ASSAY OF TROPONIN QUANT: CPT

## 2025-01-13 PROCEDURE — 85025 COMPLETE CBC W/AUTO DIFF WBC: CPT

## 2025-01-13 PROCEDURE — 84443 ASSAY THYROID STIM HORMONE: CPT

## 2025-01-13 PROCEDURE — 83540 ASSAY OF IRON: CPT

## 2025-01-13 PROCEDURE — 6370000000 HC RX 637 (ALT 250 FOR IP): Performed by: EMERGENCY MEDICINE

## 2025-01-13 PROCEDURE — 82728 ASSAY OF FERRITIN: CPT

## 2025-01-13 PROCEDURE — 6360000002 HC RX W HCPCS: Performed by: EMERGENCY MEDICINE

## 2025-01-13 PROCEDURE — 88108 CYTOPATH CONCENTRATE TECH: CPT | Performed by: PATHOLOGY

## 2025-01-13 PROCEDURE — 93005 ELECTROCARDIOGRAM TRACING: CPT | Performed by: INTERNAL MEDICINE

## 2025-01-13 PROCEDURE — 88305 TISSUE EXAM BY PATHOLOGIST: CPT | Performed by: PATHOLOGY

## 2025-01-13 PROCEDURE — 94640 AIRWAY INHALATION TREATMENT: CPT

## 2025-01-13 PROCEDURE — 96374 THER/PROPH/DIAG INJ IV PUSH: CPT

## 2025-01-13 PROCEDURE — 80048 BASIC METABOLIC PNL TOTAL CA: CPT

## 2025-01-13 PROCEDURE — 2060000000 HC ICU INTERMEDIATE R&B

## 2025-01-13 PROCEDURE — 2500000003 HC RX 250 WO HCPCS: Performed by: EMERGENCY MEDICINE

## 2025-01-13 PROCEDURE — 85610 PROTHROMBIN TIME: CPT

## 2025-01-13 PROCEDURE — 71260 CT THORAX DX C+: CPT

## 2025-01-13 PROCEDURE — 99285 EMERGENCY DEPT VISIT HI MDM: CPT

## 2025-01-13 PROCEDURE — 83880 ASSAY OF NATRIURETIC PEPTIDE: CPT

## 2025-01-13 PROCEDURE — 5A0945A ASSISTANCE WITH RESPIRATORY VENTILATION, 24-96 CONSECUTIVE HOURS, HIGH NASAL FLOW/VELOCITY: ICD-10-PCS | Performed by: STUDENT IN AN ORGANIZED HEALTH CARE EDUCATION/TRAINING PROGRAM

## 2025-01-13 PROCEDURE — 96375 TX/PRO/DX INJ NEW DRUG ADDON: CPT

## 2025-01-13 PROCEDURE — 83615 LACTATE (LD) (LDH) ENZYME: CPT

## 2025-01-13 PROCEDURE — 36415 COLL VENOUS BLD VENIPUNCTURE: CPT

## 2025-01-13 PROCEDURE — 2700000000 HC OXYGEN THERAPY PER DAY

## 2025-01-13 PROCEDURE — 83550 IRON BINDING TEST: CPT

## 2025-01-13 PROCEDURE — 6360000004 HC RX CONTRAST MEDICATION: Performed by: EMERGENCY MEDICINE

## 2025-01-13 PROCEDURE — 71045 X-RAY EXAM CHEST 1 VIEW: CPT

## 2025-01-13 PROCEDURE — 2580000003 HC RX 258: Performed by: INTERNAL MEDICINE

## 2025-01-13 PROCEDURE — 6370000000 HC RX 637 (ALT 250 FOR IP): Performed by: INTERNAL MEDICINE

## 2025-01-13 PROCEDURE — 2500000003 HC RX 250 WO HCPCS: Performed by: INTERNAL MEDICINE

## 2025-01-13 PROCEDURE — 84155 ASSAY OF PROTEIN SERUM: CPT

## 2025-01-13 PROCEDURE — 6360000002 HC RX W HCPCS: Performed by: INTERNAL MEDICINE

## 2025-01-13 RX ORDER — FUROSEMIDE 10 MG/ML
40 INJECTION INTRAMUSCULAR; INTRAVENOUS ONCE
Status: COMPLETED | OUTPATIENT
Start: 2025-01-13 | End: 2025-01-13

## 2025-01-13 RX ORDER — ALBUTEROL SULFATE 0.83 MG/ML
2.5 SOLUTION RESPIRATORY (INHALATION) 4 TIMES DAILY PRN
Status: DISCONTINUED | OUTPATIENT
Start: 2025-01-13 | End: 2025-01-18 | Stop reason: HOSPADM

## 2025-01-13 RX ORDER — ALPRAZOLAM 0.5 MG
0.5 TABLET ORAL 2 TIMES DAILY PRN
Status: DISCONTINUED | OUTPATIENT
Start: 2025-01-13 | End: 2025-01-18 | Stop reason: HOSPADM

## 2025-01-13 RX ORDER — POTASSIUM CHLORIDE 7.45 MG/ML
10 INJECTION INTRAVENOUS PRN
Status: ACTIVE | OUTPATIENT
Start: 2025-01-13 | End: 2025-01-16

## 2025-01-13 RX ORDER — ONDANSETRON 2 MG/ML
4 INJECTION INTRAMUSCULAR; INTRAVENOUS EVERY 6 HOURS PRN
Status: DISCONTINUED | OUTPATIENT
Start: 2025-01-13 | End: 2025-01-18 | Stop reason: HOSPADM

## 2025-01-13 RX ORDER — ALPRAZOLAM 0.5 MG
0.5 TABLET ORAL 2 TIMES DAILY PRN
Status: ON HOLD | COMMUNITY
Start: 2024-12-30 | End: 2025-01-18

## 2025-01-13 RX ORDER — IPRATROPIUM BROMIDE AND ALBUTEROL SULFATE 2.5; .5 MG/3ML; MG/3ML
1 SOLUTION RESPIRATORY (INHALATION) ONCE
Status: COMPLETED | OUTPATIENT
Start: 2025-01-13 | End: 2025-01-13

## 2025-01-13 RX ORDER — SODIUM CHLORIDE 0.9 % (FLUSH) 0.9 %
5-40 SYRINGE (ML) INJECTION PRN
Status: DISCONTINUED | OUTPATIENT
Start: 2025-01-13 | End: 2025-01-18 | Stop reason: HOSPADM

## 2025-01-13 RX ORDER — ONDANSETRON 4 MG/1
4 TABLET, ORALLY DISINTEGRATING ORAL EVERY 8 HOURS PRN
Status: DISCONTINUED | OUTPATIENT
Start: 2025-01-13 | End: 2025-01-18 | Stop reason: HOSPADM

## 2025-01-13 RX ORDER — SODIUM CHLORIDE 0.9 % (FLUSH) 0.9 %
5-40 SYRINGE (ML) INJECTION EVERY 12 HOURS SCHEDULED
Status: DISCONTINUED | OUTPATIENT
Start: 2025-01-13 | End: 2025-01-18 | Stop reason: HOSPADM

## 2025-01-13 RX ORDER — ESCITALOPRAM OXALATE 10 MG/1
10 TABLET ORAL DAILY
COMMUNITY
Start: 2024-10-23

## 2025-01-13 RX ORDER — ALBUTEROL SULFATE 90 UG/1
2 INHALANT RESPIRATORY (INHALATION) 4 TIMES DAILY PRN
Status: DISCONTINUED | OUTPATIENT
Start: 2025-01-13 | End: 2025-01-18 | Stop reason: HOSPADM

## 2025-01-13 RX ORDER — ENOXAPARIN SODIUM 100 MG/ML
40 INJECTION SUBCUTANEOUS DAILY
Status: DISCONTINUED | OUTPATIENT
Start: 2025-01-13 | End: 2025-01-18 | Stop reason: HOSPADM

## 2025-01-13 RX ORDER — ACETAMINOPHEN 325 MG/1
650 TABLET ORAL EVERY 6 HOURS PRN
Status: DISCONTINUED | OUTPATIENT
Start: 2025-01-13 | End: 2025-01-18 | Stop reason: HOSPADM

## 2025-01-13 RX ORDER — ACETAMINOPHEN 650 MG/1
650 SUPPOSITORY RECTAL EVERY 6 HOURS PRN
Status: DISCONTINUED | OUTPATIENT
Start: 2025-01-13 | End: 2025-01-18 | Stop reason: HOSPADM

## 2025-01-13 RX ORDER — QUETIAPINE FUMARATE 25 MG/1
50 TABLET, FILM COATED ORAL NIGHTLY
Status: DISCONTINUED | OUTPATIENT
Start: 2025-01-14 | End: 2025-01-18 | Stop reason: HOSPADM

## 2025-01-13 RX ORDER — SODIUM CHLORIDE 9 MG/ML
INJECTION, SOLUTION INTRAVENOUS PRN
Status: DISCONTINUED | OUTPATIENT
Start: 2025-01-13 | End: 2025-01-18 | Stop reason: HOSPADM

## 2025-01-13 RX ORDER — PRAMIPEXOLE DIHYDROCHLORIDE 0.25 MG/1
0.12 TABLET ORAL NIGHTLY
Status: DISCONTINUED | OUTPATIENT
Start: 2025-01-14 | End: 2025-01-18 | Stop reason: HOSPADM

## 2025-01-13 RX ORDER — ESCITALOPRAM OXALATE 10 MG/1
10 TABLET ORAL DAILY
Status: DISCONTINUED | OUTPATIENT
Start: 2025-01-13 | End: 2025-01-18 | Stop reason: HOSPADM

## 2025-01-13 RX ORDER — POTASSIUM CHLORIDE 1500 MG/1
40 TABLET, EXTENDED RELEASE ORAL PRN
Status: ACTIVE | OUTPATIENT
Start: 2025-01-13 | End: 2025-01-16

## 2025-01-13 RX ORDER — CARVEDILOL 6.25 MG/1
3.12 TABLET ORAL 2 TIMES DAILY WITH MEALS
Status: DISCONTINUED | OUTPATIENT
Start: 2025-01-13 | End: 2025-01-15

## 2025-01-13 RX ORDER — MELOXICAM 7.5 MG/1
7.5 TABLET ORAL 2 TIMES DAILY
Status: DISCONTINUED | OUTPATIENT
Start: 2025-01-14 | End: 2025-01-14

## 2025-01-13 RX ORDER — IOPAMIDOL 755 MG/ML
75 INJECTION, SOLUTION INTRAVASCULAR
Status: COMPLETED | OUTPATIENT
Start: 2025-01-13 | End: 2025-01-13

## 2025-01-13 RX ORDER — POLYETHYLENE GLYCOL 3350 17 G/17G
17 POWDER, FOR SOLUTION ORAL DAILY PRN
Status: DISCONTINUED | OUTPATIENT
Start: 2025-01-13 | End: 2025-01-18 | Stop reason: HOSPADM

## 2025-01-13 RX ORDER — FUROSEMIDE 20 MG/1
20 TABLET ORAL DAILY
Status: ON HOLD | COMMUNITY
Start: 2024-12-17 | End: 2025-01-18 | Stop reason: HOSPADM

## 2025-01-13 RX ORDER — MAGNESIUM SULFATE IN WATER 40 MG/ML
2000 INJECTION, SOLUTION INTRAVENOUS PRN
Status: DISCONTINUED | OUTPATIENT
Start: 2025-01-13 | End: 2025-01-18 | Stop reason: HOSPADM

## 2025-01-13 RX ADMIN — WATER 125 MG: 1 INJECTION INTRAMUSCULAR; INTRAVENOUS; SUBCUTANEOUS at 10:55

## 2025-01-13 RX ADMIN — FUROSEMIDE 40 MG: 10 INJECTION, SOLUTION INTRAMUSCULAR; INTRAVENOUS at 13:43

## 2025-01-13 RX ADMIN — ALPRAZOLAM 0.5 MG: 0.5 TABLET ORAL at 23:01

## 2025-01-13 RX ADMIN — BUMETANIDE 0.5 MG/HR: 0.25 INJECTION INTRAMUSCULAR; INTRAVENOUS at 22:48

## 2025-01-13 RX ADMIN — IPRATROPIUM BROMIDE AND ALBUTEROL SULFATE 1 DOSE: .5; 2.5 SOLUTION RESPIRATORY (INHALATION) at 11:10

## 2025-01-13 RX ADMIN — SODIUM CHLORIDE, PRESERVATIVE FREE 10 ML: 5 INJECTION INTRAVENOUS at 23:03

## 2025-01-13 RX ADMIN — CARVEDILOL 3.12 MG: 6.25 TABLET, FILM COATED ORAL at 22:36

## 2025-01-13 RX ADMIN — IOPAMIDOL 75 ML: 755 INJECTION, SOLUTION INTRAVENOUS at 14:15

## 2025-01-13 RX ADMIN — IPRATROPIUM BROMIDE AND ALBUTEROL SULFATE 1 DOSE: .5; 2.5 SOLUTION RESPIRATORY (INHALATION) at 11:11

## 2025-01-13 ASSESSMENT — PAIN SCALES - GENERAL
PAINLEVEL_OUTOF10: 0
PAINLEVEL_OUTOF10: 0

## 2025-01-13 NOTE — H&P
normal contrasted appearance and are normal size and contour. There is no evidence for dissection. The coronary arteries are not aneurysmal. Pulmonary arterial vasculature: The pulmonary arterial vasculature demonstrates good contrast opacification. There is no evidence for a focal filling defect. There is no evidence for focal stenosis. There is enlargement of the main pulmonary artery relative to the ascending thoracic aorta at the same level.. 3-D reconstructed images: MIP images confirm and further demonstrate the findings from the axial source images. No additional focal abnormalities are seen. Heart: The heart size is enlarged. There is evidence for right heart enlargement. Small pericardial effusion is seen. Mediastinum: No pathologically enlarged mediastinal lymph nodes are seen. No focal mass lesions are identified. There is no evidence for hiatal hernia. Pleura: Moderate to large large right pleural effusion is seen. Tracheobronchial tree: There is bronchiectasis. Lungs : There is mild consolidation seen in the right lower lobe. Atelectasis or  pneumonia could appear similar. Emphysematous changes are seen in the lungs in addition to subpleural honeycombing identified in portions of the lungs as well.  There is no definitive evidence for suspicious noncalcified nodule or mass. Upper abdomen: The visualized upper abdomen is grossly unremarkable. Bones: No significant bony abnormalities are noted. Chest wall: The chest wall and axilla are within normal limits. IMPRESSION: 1.  There is no CT evidence for pulmonary embolus 2.  There is a moderate to large right pleural effusion with some underlying atelectasis or pneumonia in the right lower lobe 3.  There is a combined emphysema and fibrosis pattern within the lungs 4.  There are findings suggestive of pulmonary artery hypertension with elevated  right heart pressures. This dictation was created with voice recognition software.  While attempts have  been made  to review the dictation as it is transcribed, on occasion the spoken word can be misinterpreted by the technology leading to omissions or inappropriate words, phrases or sentences.  Dictated and Electronically Signed By: Fito Gamino 1/13/2025 14:26        XR CHEST PORTABLE    Result Date: 1/13/2025  PORTABLE CHEST Clinical History: Shortness of breath with hypoxia DATE OF SERVICE:1/13/2025 11:57 Comparison: None Findings: Single AP portable chest obtained at 1100 demonstrates airspace opacity right lower lobe with suspected small right pleural effusion. Left lung is clear. Cardiac silhouette is enlarged. Visualized osseous structures are grossly unremarkable. Trachea is midline. IMPRESSION: Right lower lobe airspace opacity with suspected small right pleural  effusion. This dictation was created with voice recognition software. Although every effort is made to review the dictation as it is transcribed, on occasion the spoken word can be misinterpreted by the technology leading to omissions or inappropriate words, phrases or sentences.  Dictated and Electronically Signed By: Santiago Quinones 1/13/2025 11:22            Time spent >45 mins of critical care time    Electronically signed by Deshaun Rodgers MD on 1/13/2025 at 2:40 PM

## 2025-01-13 NOTE — ED PROVIDER NOTES
Emergency Department Encounter    Patient: Alondra Ho  MRN: [id number]  : 1960  Date of Evaluation: 2025  ED Provider:  Luis A Chaves MD    Triage Chief Complaint:   Shortness of Breath    Ewiiaapaayp:  Alondra Ho is a 64 y.o. female recently diagnosed with COPD on 2-3 liters of oxygen at home continue that presents to emergency department via ambulance from home for worsening shortness of breath for at least 4 days may be much longer associated with productive cough but no leg pain or leg swelling or weight gain.  Patient states she noticed it her oxygen drops into the 50s on exertion even though she is wearing 2 to 3 L of oxygen per nasal cannula repeatedly without chest abdominal pain.  She denies fever or chills in the recent past.  Patient also denies runny nose or nasal congestion.  EMS reported patient oxygen saturation on minimal exertion dropped into the 40s.  At the time examination patient ox saturation is 97% on high flow oxygen.    ROS - see HPI, below listed is current ROS at time of my eval:  General:  No fevers, no chills, no weakness  Eyes:  No recent vison changes, no discharge  ENT:  No sore throat, no nasal congestion, no hearing changes  Cardiovascular:  No chest pain, no palpitations  Respiratory: Shortness of breath, exertional hypoxia, cough  Gastrointestinal:  No pain, no nausea, no vomiting, no diarrhea  Musculoskeletal:  No muscle pain, no joint pain  Skin:  No rash, no pruritis, no easy bruising  Neurologic:  No speech problems, no headache, no extremity numbness, no extremity tingling, no extremity weakness  Psychiatric:  No anxiety  Extremities:  no edema, no pain    Past Medical History:   Diagnosis Date    Allergic rhinitis     flowers    Anxiety     Arthritis     left hip    Chronic back pain     Chronic obstructive pulmonary disease (HCC) 7/10/2024    Depression     Essential hypertension 2016    Fall     \"fell coming in the door at home on 2015- went to ER-

## 2025-01-13 NOTE — TELEPHONE ENCOUNTER
Patient arrived to pulmonary clinic for follow up visit on 2 liter oxygen. She was checking in when Opal FISCHER,  noticed patient's SOB and cyanosis in face and lips. Patient states she feels like her oxygen concentrator is not working properly. Kayce FINK MA check SpO2 and found it to read 48% on 2 liters. HR was 99 bpm. Patient was placed on 4 liters at first which slowly increased O2 sat to 54% . EMS was implemented for transport to ED for further evaluation. She was placed on 6 liters with little results. At a flow of 10 liters, patient began to respond to oxygen and saturation kristina to 85 % and eventually reached 94% but did not stay there for long. Patient's SpO2 was Volital during time in clinic lobby. She as transported [place] ED by two EMS responders. Patient reported \"not feeling well.\" But did not lose consciousness. Granddaughter was contacted and patient instructed her to go home, but to call her daughter so that she can meet the pateint in hospital and be provided with updates on pateint's status and care. Patient was placed on a NRB prior to transport by EMS x 2 on a stretcher.

## 2025-01-13 NOTE — ED NOTES
Medication History  [facility]    Patient Name: Alondra Ho 1960     Medication history has been completed by: Lelo Yeboah CP    Source(s) of information: patient and insurance claims     Primary Care Physician: Grzegorz Quach MD     Pharmacy: [facility]    Allergies as of 01/13/2025 - Reviewed 01/13/2025   Allergen Reaction Noted    Flexeril [cyclobenzaprine] Other (See Comments) 09/04/2018    Lisinopril Other (See Comments) 07/08/2016        Prior to Admission medications    Medication Sig Start Date End Date Taking? Authorizing Provider   escitalopram (LEXAPRO) 10 MG tablet Take 1 tablet by mouth daily 10/23/24  Yes Provider, MD Nicole   furosemide (LASIX) 20 MG tablet Take 1 tablet by mouth daily 12/17/24  Yes Provider, MD Nicole   ALPRAZolam (XANAX) 0.5 MG tablet Take 1 tablet by mouth 2 times daily as needed. 12/30/24 1/14/25 Yes ProviderNicole MD   amLODIPine (NORVASC) 5 MG tablet Take 1 tablet by mouth daily 1/9/23  Yes Micah Abreu PA   simvastatin (ZOCOR) 20 MG tablet Take 1 tablet by mouth nightly 1/9/23  Yes Micah Abreu PA   QUEtiapine (SEROQUEL) 50 MG tablet Take 1-2 tablets by mouth nightly 1/9/23  Yes Micah Abreu PA   albuterol sulfate HFA (VENTOLIN HFA) 108 (90 Base) MCG/ACT inhaler Inhale 2 puffs into the lungs 4 times daily as needed for Wheezing 11/11/22  Yes Micah Abreu PA   albuterol (PROVENTIL) (2.5 MG/3ML) 0.083% nebulizer solution Take 3 mLs by nebulization 4 times daily as needed for Wheezing 7/10/24   Aurelia Johnson APRN - CNP   meloxicam (MOBIC) 7.5 MG tablet Take 1 tablet by mouth 2 times daily 1/9/23   Micah Abreu PA   pramipexole (MIRAPEX) 0.125 MG tablet Take 1 tablet by mouth nightly 1/9/23   Micah Abreu PA     Medications added or changed (ex. new medication, dosage change, interval change, formulation change):  Alprazolam 0.5 mg BID (added)  Escitalopram 10 mg  daily (added)  Furosemide 20 mg daily (added)  Meloxicam frequency change from daily to BID    Medications removed from list (include reason, ex. noncompliance, medication cost, therapy complete etc.):   Celecoxib not taking  Norco not taking  Tramadol not taking    Medications requiring reconciliation with provider:    Alprazolam 0.5 mg BID (added)  Escitalopram 10 mg daily (added)  Furosemide 20 mg daily (added)    Comments:  Medication list reviewed with patient and insurance claims verified.  Patient reports she has taken no medications prior to ER visit.    To my knowledge the above medication history is accurate as of 1/13/2025 2:47 PM.   Lelo Yeboah CPhT   1/13/2025 2:47 PM

## 2025-01-14 ENCOUNTER — APPOINTMENT (OUTPATIENT)
Dept: GENERAL RADIOLOGY | Age: 65
End: 2025-01-14
Payer: COMMERCIAL

## 2025-01-14 ENCOUNTER — APPOINTMENT (OUTPATIENT)
Dept: NON INVASIVE DIAGNOSTICS | Age: 65
End: 2025-01-14
Attending: INTERNAL MEDICINE
Payer: COMMERCIAL

## 2025-01-14 ENCOUNTER — APPOINTMENT (OUTPATIENT)
Dept: INTERVENTIONAL RADIOLOGY/VASCULAR | Age: 65
End: 2025-01-14
Payer: COMMERCIAL

## 2025-01-14 LAB
ALBUMIN SERPL-MCNC: 3.2 G/DL (ref 3.4–5)
ALBUMIN/GLOB SERPL: 1.2 {RATIO} (ref 1.1–2.2)
ALP SERPL-CCNC: 71 U/L (ref 40–129)
ALT SERPL-CCNC: 8 U/L (ref 10–40)
ANION GAP SERPL CALCULATED.3IONS-SCNC: 10 MMOL/L (ref 9–17)
APPEARANCE FLD: CLEAR
AST SERPL-CCNC: 24 U/L (ref 15–37)
BILIRUB DIRECT SERPL-MCNC: 0.6 MG/DL (ref 0–0.3)
BILIRUB INDIRECT SERPL-MCNC: 0.5 MG/DL (ref 0–0.7)
BILIRUB SERPL-MCNC: 1.1 MG/DL (ref 0–1)
BODY FLD TYPE: NORMAL
BUN SERPL-MCNC: 16 MG/DL (ref 7–20)
CALCIUM SERPL-MCNC: 9.4 MG/DL (ref 8.3–10.6)
CHLORIDE SERPL-SCNC: 95 MMOL/L (ref 99–110)
CHOLEST SERPL-MCNC: 70 MG/DL (ref 125–199)
CLOT CHECK: NORMAL
CO2 SERPL-SCNC: 33 MMOL/L (ref 21–32)
COLOR FLD: YELLOW
CREAT SERPL-MCNC: 0.9 MG/DL (ref 0.6–1.2)
ECHO AO ROOT DIAM: 3.2 CM
ECHO AO ROOT INDEX: 1.77 CM/M2
ECHO AV AREA PEAK VELOCITY: 2.5 CM2
ECHO AV AREA VTI: 2.3 CM2
ECHO AV AREA/BSA PEAK VELOCITY: 1.4 CM2/M2
ECHO AV AREA/BSA VTI: 1.3 CM2/M2
ECHO AV MEAN GRADIENT: 2 MMHG
ECHO AV MEAN VELOCITY: 0.7 M/S
ECHO AV PEAK GRADIENT: 4 MMHG
ECHO AV PEAK VELOCITY: 1 M/S
ECHO AV VELOCITY RATIO: 0.7
ECHO AV VTI: 18.9 CM
ECHO BSA: 1.86 M2
ECHO EST RA PRESSURE: 15 MMHG
ECHO IVC PROX: 2.3 CM
ECHO LA AREA 4C: 12.5 CM2
ECHO LA DIAMETER INDEX: 1.88 CM/M2
ECHO LA DIAMETER: 3.4 CM
ECHO LA MAJOR AXIS: 5.6 CM
ECHO LA TO AORTIC ROOT RATIO: 1.06
ECHO LA VOL MOD A4C: 23 ML (ref 22–52)
ECHO LA VOLUME INDEX MOD A4C: 13 ML/M2 (ref 16–34)
ECHO LV E' LATERAL VELOCITY: 4.1 CM/S
ECHO LV E' SEPTAL VELOCITY: 4.5 CM/S
ECHO LV EDV A4C: 47 ML
ECHO LV EDV INDEX A4C: 26 ML/M2
ECHO LV EF PHYSICIAN: 55 %
ECHO LV EJECTION FRACTION A4C: 62 %
ECHO LV ESV A4C: 18 ML
ECHO LV ESV INDEX A4C: 10 ML/M2
ECHO LV FRACTIONAL SHORTENING: 46 % (ref 28–44)
ECHO LV INTERNAL DIMENSION DIASTOLE INDEX: 2.27 CM/M2
ECHO LV INTERNAL DIMENSION DIASTOLIC: 4.1 CM (ref 3.9–5.3)
ECHO LV INTERNAL DIMENSION SYSTOLIC INDEX: 1.22 CM/M2
ECHO LV INTERNAL DIMENSION SYSTOLIC: 2.2 CM
ECHO LV IVSD: 0.8 CM (ref 0.6–0.9)
ECHO LV MASS 2D: 114.1 G (ref 67–162)
ECHO LV MASS INDEX 2D: 63.1 G/M2 (ref 43–95)
ECHO LV POSTERIOR WALL DIASTOLIC: 1 CM (ref 0.6–0.9)
ECHO LV RELATIVE WALL THICKNESS RATIO: 0.49
ECHO LVOT AREA: 3.5 CM2
ECHO LVOT AV VTI INDEX: 0.65
ECHO LVOT DIAM: 2.1 CM
ECHO LVOT MEAN GRADIENT: 1 MMHG
ECHO LVOT PEAK GRADIENT: 2 MMHG
ECHO LVOT PEAK VELOCITY: 0.7 M/S
ECHO LVOT STROKE VOLUME INDEX: 23.5 ML/M2
ECHO LVOT SV: 42.6 ML
ECHO LVOT VTI: 12.3 CM
ECHO MV A VELOCITY: 0.72 M/S
ECHO MV E DECELERATION TIME (DT): 268 MS
ECHO MV E VELOCITY: 0.33 M/S
ECHO MV E/A RATIO: 0.46
ECHO MV E/E' LATERAL: 8.05
ECHO MV E/E' RATIO (AVERAGED): 7.69
ECHO MV E/E' SEPTAL: 7.33
ECHO RIGHT VENTRICULAR SYSTOLIC PRESSURE (RVSP): 72 MMHG
ECHO RV FREE WALL PEAK S': 4.6 CM/S
ECHO RV MID DIMENSION: 4.7 CM
ECHO TV REGURGITANT MAX VELOCITY: 3.79 M/S
ECHO TV REGURGITANT PEAK GRADIENT: 57 MMHG
EKG ATRIAL RATE: 78 BPM
EKG DIAGNOSIS: NORMAL
EKG P AXIS: 47 DEGREES
EKG P-R INTERVAL: 148 MS
EKG Q-T INTERVAL: 380 MS
EKG QRS DURATION: 78 MS
EKG QTC CALCULATION (BAZETT): 433 MS
EKG R AXIS: 150 DEGREES
EKG T AXIS: 51 DEGREES
EKG VENTRICULAR RATE: 78 BPM
FERRITIN SERPL-MCNC: 65 NG/ML (ref 15–150)
GFR, ESTIMATED: 60 ML/MIN/1.73M2
GLUCOSE FLD-MCNC: 154 MG/DL
GLUCOSE SERPL-MCNC: 119 MG/DL (ref 74–99)
HDLC SERPL-MCNC: 19 MG/DL
LDH FLD L TO P-CCNC: 146 U/L
LDLC SERPL CALC-MCNC: 38 MG/DL
MAGNESIUM SERPL-MCNC: 1.5 MG/DL (ref 1.8–2.4)
MONOCYTES NFR FLD: 91 %
NEUTROPHILS NFR FLD: 9 %
PH FLUID: 8 (ref 6.5–7.5)
POTASSIUM SERPL-SCNC: 3.1 MMOL/L (ref 3.5–5.1)
PROT FLD-MCNC: 3 G/DL
PROT SERPL-MCNC: 6 G/DL (ref 6.4–8.2)
RBC # FLD: <2000 CELLS/UL
SODIUM SERPL-SCNC: 138 MMOL/L (ref 136–145)
SPECIMEN TYPE: ABNORMAL
SPECIMEN TYPE: NORMAL
TRIGL SERPL-MCNC: 65 MG/DL
WBC # FLD: 903 CELLS/UL

## 2025-01-14 PROCEDURE — 93306 TTE W/DOPPLER COMPLETE: CPT

## 2025-01-14 PROCEDURE — 2060000000 HC ICU INTERMEDIATE R&B

## 2025-01-14 PROCEDURE — 6370000000 HC RX 637 (ALT 250 FOR IP): Performed by: INTERNAL MEDICINE

## 2025-01-14 PROCEDURE — 84157 ASSAY OF PROTEIN OTHER: CPT

## 2025-01-14 PROCEDURE — 89051 BODY FLUID CELL COUNT: CPT

## 2025-01-14 PROCEDURE — 87070 CULTURE OTHR SPECIMN AEROBIC: CPT

## 2025-01-14 PROCEDURE — 87075 CULTR BACTERIA EXCEPT BLOOD: CPT

## 2025-01-14 PROCEDURE — 82248 BILIRUBIN DIRECT: CPT

## 2025-01-14 PROCEDURE — 2580000003 HC RX 258: Performed by: INTERNAL MEDICINE

## 2025-01-14 PROCEDURE — 6360000002 HC RX W HCPCS: Performed by: INTERNAL MEDICINE

## 2025-01-14 PROCEDURE — 82945 GLUCOSE OTHER FLUID: CPT

## 2025-01-14 PROCEDURE — 71045 X-RAY EXAM CHEST 1 VIEW: CPT

## 2025-01-14 PROCEDURE — 36415 COLL VENOUS BLD VENIPUNCTURE: CPT

## 2025-01-14 PROCEDURE — C1729 CATH, DRAINAGE: HCPCS

## 2025-01-14 PROCEDURE — 83986 ASSAY PH BODY FLUID NOS: CPT

## 2025-01-14 PROCEDURE — 83735 ASSAY OF MAGNESIUM: CPT

## 2025-01-14 PROCEDURE — 83615 LACTATE (LD) (LDH) ENZYME: CPT

## 2025-01-14 PROCEDURE — 0W993ZZ DRAINAGE OF RIGHT PLEURAL CAVITY, PERCUTANEOUS APPROACH: ICD-10-PCS | Performed by: RADIOLOGY

## 2025-01-14 PROCEDURE — 80061 LIPID PANEL: CPT

## 2025-01-14 PROCEDURE — 99223 1ST HOSP IP/OBS HIGH 75: CPT | Performed by: INTERNAL MEDICINE

## 2025-01-14 PROCEDURE — 93010 ELECTROCARDIOGRAM REPORT: CPT | Performed by: INTERNAL MEDICINE

## 2025-01-14 PROCEDURE — 32555 ASPIRATE PLEURA W/ IMAGING: CPT | Performed by: RADIOLOGY

## 2025-01-14 PROCEDURE — 93306 TTE W/DOPPLER COMPLETE: CPT | Performed by: INTERNAL MEDICINE

## 2025-01-14 PROCEDURE — 80053 COMPREHEN METABOLIC PANEL: CPT

## 2025-01-14 PROCEDURE — 32555 ASPIRATE PLEURA W/ IMAGING: CPT

## 2025-01-14 PROCEDURE — 2500000003 HC RX 250 WO HCPCS: Performed by: INTERNAL MEDICINE

## 2025-01-14 PROCEDURE — 6370000000 HC RX 637 (ALT 250 FOR IP): Performed by: STUDENT IN AN ORGANIZED HEALTH CARE EDUCATION/TRAINING PROGRAM

## 2025-01-14 PROCEDURE — 87205 SMEAR GRAM STAIN: CPT

## 2025-01-14 RX ORDER — POTASSIUM CHLORIDE 1500 MG/1
40 TABLET, EXTENDED RELEASE ORAL 2 TIMES DAILY
Status: COMPLETED | OUTPATIENT
Start: 2025-01-14 | End: 2025-01-14

## 2025-01-14 RX ORDER — MAGNESIUM SULFATE 4 G/50ML
4000 INJECTION INTRAVENOUS ONCE
Status: COMPLETED | OUTPATIENT
Start: 2025-01-14 | End: 2025-01-14

## 2025-01-14 RX ADMIN — PRAMIPEXOLE DIHYDROCHLORIDE 0.12 MG: 0.25 TABLET ORAL at 00:01

## 2025-01-14 RX ADMIN — POTASSIUM CHLORIDE 40 MEQ: 1500 TABLET, EXTENDED RELEASE ORAL at 20:23

## 2025-01-14 RX ADMIN — QUETIAPINE FUMARATE 50 MG: 25 TABLET ORAL at 00:01

## 2025-01-14 RX ADMIN — PRAMIPEXOLE DIHYDROCHLORIDE 0.12 MG: 0.25 TABLET ORAL at 20:23

## 2025-01-14 RX ADMIN — MELOXICAM 7.5 MG: 7.5 TABLET ORAL at 00:01

## 2025-01-14 RX ADMIN — CARVEDILOL 3.12 MG: 6.25 TABLET, FILM COATED ORAL at 18:34

## 2025-01-14 RX ADMIN — QUETIAPINE FUMARATE 50 MG: 25 TABLET ORAL at 20:24

## 2025-01-14 RX ADMIN — CARVEDILOL 3.12 MG: 6.25 TABLET, FILM COATED ORAL at 09:48

## 2025-01-14 RX ADMIN — BUMETANIDE 0.5 MG/HR: 0.25 INJECTION INTRAMUSCULAR; INTRAVENOUS at 21:17

## 2025-01-14 RX ADMIN — SODIUM CHLORIDE, PRESERVATIVE FREE 10 ML: 5 INJECTION INTRAVENOUS at 21:15

## 2025-01-14 RX ADMIN — POTASSIUM CHLORIDE 40 MEQ: 1500 TABLET, EXTENDED RELEASE ORAL at 15:23

## 2025-01-14 RX ADMIN — ESCITALOPRAM OXALATE 10 MG: 10 TABLET ORAL at 09:48

## 2025-01-14 RX ADMIN — ALPRAZOLAM 0.5 MG: 0.5 TABLET ORAL at 21:14

## 2025-01-14 RX ADMIN — MAGNESIUM SULFATE HEPTAHYDRATE 4000 MG: 80 INJECTION, SOLUTION INTRAVENOUS at 18:46

## 2025-01-14 ASSESSMENT — PAIN DESCRIPTION - PAIN TYPE: TYPE: CHRONIC PAIN

## 2025-01-14 ASSESSMENT — PAIN DESCRIPTION - DESCRIPTORS: DESCRIPTORS: ACHING

## 2025-01-14 ASSESSMENT — PAIN SCALES - GENERAL
PAINLEVEL_OUTOF10: 0
PAINLEVEL_OUTOF10: 0
PAINLEVEL_OUTOF10: 5
PAINLEVEL_OUTOF10: 3

## 2025-01-14 ASSESSMENT — PAIN - FUNCTIONAL ASSESSMENT: PAIN_FUNCTIONAL_ASSESSMENT: ACTIVITIES ARE NOT PREVENTED

## 2025-01-14 ASSESSMENT — PAIN DESCRIPTION - ORIENTATION: ORIENTATION: RIGHT;LEFT

## 2025-01-14 ASSESSMENT — PAIN DESCRIPTION - LOCATION: LOCATION: HIP

## 2025-01-14 NOTE — PROGRESS NOTES
Direct 0.6 (H) 0.0 - 0.3 mg/dL    Bilirubin, Indirect 0.5 0.0 - 0.7 mg/dL    Total Protein 6.0 (L) 6.4 - 8.2 g/dL    Albumin/Globulin Ratio 1.2 1.1 - 2.2   Lipid Panel    Collection Time: 01/14/25  4:45 AM   Result Value Ref Range    Cholesterol, Total 70 (L) 125 - 199 mg/dL    HDL 19 (L) >40 mg/dL    LDL Cholesterol 38 <101 mg/dL    Triglycerides 65 <150 mg/dL   Cell Count with Differential, Body Fluid    Collection Time: 01/14/25  8:45 AM   Result Value Ref Range    Color, Fluid Yellow     Appearance, Fluid Clear     WBC, Fluid 903 cells/uL    RBC, Fluid <2,000 cells/uL    Specimen Type .PLEURAL FLUID     Clot Check None Seen     Neutrophil Count, Fluid 9 %    Monocyte Count, Fluid 91 %   Culture, Body Fluid (with Gram Stain)    Collection Time: 01/14/25  8:45 AM    Specimen: Body Fluid   Result Value Ref Range    Specimen Description .BODY FLUID     Special Requests Site: Body Fluid     Direct Exam MANY NEUTROPHILS     Direct Exam MODERATE RBC'S     Direct Exam NO ORGANISMS SEEN     Direct Exam       Gram stain made from cytocentrifuged specimen.  Organisms and cells will be concentrated.    Culture PENDING    pH, body fluid    Collection Time: 01/14/25  8:45 AM   Result Value Ref Range    Specimen Type .PLEURAL FLUID     pH, Fluid 8.0 (H) 6.5 - 7.5   Basic Metabolic Panel    Collection Time: 01/14/25  9:44 AM   Result Value Ref Range    Sodium 138 136 - 145 mmol/L    Potassium 3.1 (L) 3.5 - 5.1 mmol/L    Chloride 95 (L) 99 - 110 mmol/L    CO2 33 (H) 21 - 32 mmol/L    Anion Gap 10 9 - 17 mmol/L    Glucose 119 (H) 74 - 99 mg/dL    BUN 16 7 - 20 mg/dL    Creatinine 0.9 0.6 - 1.2 mg/dL    Est, Glom Filt Rate 60 (L) >60 mL/min/1.73m2    Calcium 9.4 8.3 - 10.6 mg/dL   Magnesium    Collection Time: 01/14/25  9:44 AM   Result Value Ref Range    Magnesium 1.5 (L) 1.8 - 2.4 mg/dL   Echo (TTE) complete (PRN contrast/bubble/strain/3D)    Collection Time: 01/14/25 10:45 AM   Result Value Ref Range    LV EDV A4C 47 mL    LV  MANY NEUTROPHILS      MODERATE RBC'S      NO ORGANISMS SEEN      Gram stain made from cytocentrifuged specimen.  Organisms and cells will be concentrated.     Culture PENDING                Electronically signed by Deshaun Rodgers MD on 1/14/2025 at 11:44 AM

## 2025-01-14 NOTE — PROGRESS NOTES
4 Eyes Skin Assessment     NAME:  Alondra Ho  YOB: 1960  MEDICAL RECORD NUMBER:  [id number]    The patient is being assessed for  Admission    I agree that at least one RN has performed a thorough Head to Toe Skin Assessment on the patient. ALL assessment sites listed below have been assessed.      Areas assessed by both nurses:    Head, Face, Ears, Shoulders, Back, Chest, Arms, Elbows, Hands, Sacrum. Buttock, Coccyx, Ischium, Legs. Feet and Heels, and Under Medical Devices         Does the Patient have a Wound? No noted wound(s)       Les Prevention initiated by RN: Yes  Wound Care Orders initiated by RN: No    Pressure Injury (Stage 3,4, Unstageable, DTI, NWPT, and Complex wounds) if present, place Wound referral order by RN under : No    New Ostomies, if present place, Ostomy referral order under : No     Nurse 1 eSignature: Electronically signed by Rosio Zapata RN on 1/14/25 at 1:36 AM EST    **SHARE this note so that the co-signing nurse can place an eSignature**    Nurse 2 eSignature: Electronically signed by Idalia Crawley RN on 1/14/25 at 5:17 AM EST

## 2025-01-14 NOTE — PLAN OF CARE
Patients stress test is on hold right now per the Cardiac Nurses due to the patient being on a Vapotherm    Marie Ballard [facility]

## 2025-01-14 NOTE — PLAN OF CARE
Problem: Discharge Planning  Goal: Discharge to home or other facility with appropriate resources  Outcome: Progressing     Problem: Pain  Goal: Verbalizes/displays adequate comfort level or baseline comfort level  Outcome: Progressing     Problem: Skin/Tissue Integrity  Goal: Absence of new skin breakdown  Description: 1.  Monitor for areas of redness and/or skin breakdown  2.  Assess vascular access sites hourly  3.  Every 4-6 hours minimum:  Change oxygen saturation probe site  4.  Every 4-6 hours:  If on nasal continuous positive airway pressure, respiratory therapy assess nares and determine need for appliance change or resting period.  Outcome: Progressing     Problem: Safety - Adult  Goal: Free from fall injury  Outcome: Progressing     Problem: Respiratory - Adult  Goal: Achieves optimal ventilation and oxygenation  Outcome: Progressing     Problem: Cardiovascular - Adult  Goal: Maintains optimal cardiac output and hemodynamic stability  Outcome: Progressing  Goal: Absence of cardiac dysrhythmias or at baseline  Outcome: Progressing     Problem: Skin/Tissue Integrity - Adult  Goal: Skin integrity remains intact  Outcome: Progressing     Problem: Infection - Adult  Goal: Absence of infection at discharge  Outcome: Progressing  Goal: Absence of infection during hospitalization  Outcome: Progressing     Problem: Metabolic/Fluid and Electrolytes - Adult  Goal: Electrolytes maintained within normal limits  Outcome: Progressing  Goal: Hemodynamic stability and optimal renal function maintained  Outcome: Progressing

## 2025-01-14 NOTE — CARE COORDINATION
01/14/25 1557   Service Assessment   Patient Orientation Alert and Oriented   Cognition Alert   History Provided By Patient;Medical Record   Primary Caregiver Self   Support Systems Children;Family Members   Patient's Healthcare Decision Maker is: Legal Next of Kin   PCP Verified by CM Yes   Last Visit to PCP Within last 3 months   Prior Functional Level Independent in ADLs/IADLs   Current Functional Level Independent in ADLs/IADLs   Can patient return to prior living arrangement Yes   Ability to make needs known: Good   Family able to assist with home care needs: Yes   Would you like for me to discuss the discharge plan with any other family members/significant others, and if so, who? No   Financial Resources Medicaid   Community Resources None   Social/Functional History   Lives With Family   Type of Home House   Home Layout One level   Home Access Stairs to enter without rails   Entrance Stairs - Number of Steps 1   Bathroom Shower/Tub Tub/Shower unit;Shower chair with back   Bathroom Toilet Standard   Bathroom Equipment Shower chair   Bathroom Accessibility Accessible   Home Equipment None   Receives Help From Family   Prior Level of Assist for ADLs Independent   Homemaking Responsibilities No   Ambulation Assistance Independent   Prior Level of Assist for Transfers Independent   Active  Yes  (car broke down. family drives)   Occupation Unemployed   Discharge Planning   Type of Residence House   Living Arrangements Family Members   Current Services Prior To Admission Oxygen Therapy   Potential Assistance Purchasing Medications No   Type of Home Care Services None   Patient expects to be discharged to: House   One/Two Story Residence One story   History of falls? 0   Services At/After Discharge   Transition of Care Consult (CM Consult) N/A   Services At/After Discharge None   Confirm Follow Up Transport Family     CM reviewed chart. CM in to see pt for discharge planning. Pt is from home and pts adult

## 2025-01-14 NOTE — CONSULTS
Consult Interventional Radiology    Date:2025  Name:Alondra Ho   :1960   MR#:[id number]    SEX:female     Planned procedure:  right thoracentesis  Indication: right pleural effusion    H&P Status: H&P was reviewed, the patient was examined and no change has occurred in patient's condition since H&P was completed.    Past Medical History:  Past Medical History:   Diagnosis Date    Allergic rhinitis     flowers    Anxiety     Arthritis     left hip    Chronic back pain     Chronic obstructive pulmonary disease (HCC) 7/10/2024    Depression     Essential hypertension 2016    Fall     \"fell coming in the door at home on 2015- went to ER- put brace on and sent me home then 2015 fell again- rehurt my left knee- for surgery\"( 2015\")    Hyperlipidemia 10/28/2016    Osteoarthritis     Restless legs syndrome     Sciatic pain     Tobacco abuse         Past Surgical History:  Past Surgical History:   Procedure Laterality Date    EYE SURGERY Left     At age 15\"for lazy eye\"    FRACTURE SURGERY  bilat wrist fx as a child    \"fell off a swing set- broke both wrists\"    PATELLA SURGERY Left 12/17/15    ORIF patella     TUBAL LIGATION  1985       Social History:  Social History     Socioeconomic History    Marital status:      Spouse name: Not on file    Number of children: Not on file    Years of education: Not on file    Highest education level: Not on file   Occupational History    Not on file   Tobacco Use    Smoking status: Former     Current packs/day: 1.00     Average packs/day: 1 pack/day for 30.0 years (30.0 ttl pk-yrs)     Types: Cigarettes    Smokeless tobacco: Never   Vaping Use    Vaping status: Never Used   Substance and Sexual Activity    Alcohol use: No    Drug use: Yes     Types: Marijuana (Weed)    Sexual activity: Yes     Partners: Male   Other Topics Concern    Not on file   Social History Narrative    Not on file     Social Determinants of Health     Financial  pramipexole (MIRAPEX) 0.125 MG tablet Take 1 tablet by mouth nightly 90 tablet 1    albuterol sulfate HFA (VENTOLIN HFA) 108 (90 Base) MCG/ACT inhaler Inhale 2 puffs into the lungs 4 times daily as needed for Wheezing 54 g 1    albuterol (PROVENTIL) (2.5 MG/3ML) 0.083% nebulizer solution Take 3 mLs by nebulization 4 times daily as needed for Wheezing 120 each 3    meloxicam (MOBIC) 7.5 MG tablet Take 1 tablet by mouth daily (Patient taking differently: Take 1 tablet by mouth 2 times daily) 90 tablet 1       Review of Systems:  A 10 point review of systems was conducted and was negative with the exception of what is noted above.     Vital Signs:  Vitals:    01/14/25 0829 01/14/25 0832 01/14/25 0835 01/14/25 0840   BP:  (!) 147/106     Pulse: 66  68 69   Resp: 19  20 23   Temp:       TempSrc:       SpO2: 100%  97% 93%   Weight:       Height:            Laboratory:  Recent Labs     01/13/25  1045 01/13/25  2125   WBC 10.6*  --    *  --    CL 99  --    CO2 21  --    BUN 13  --    CREATININE 1.0  --    GLUCOSE 125*  --    INR  --  1.5         Mallampati Score III  ASA class III    Medications reviewed: No contraindications for coagulation or sedation plans    IMAGING DATA   None available for review    ASSESSMENT AND PLAN     Pt is a good candidate for right thoracentesis    Procedure, RBA explained to patient and available family. Informed consent obtained    Electronically signed by Brittany Rader MD on 1/14/2025 at 9:36 AM

## 2025-01-14 NOTE — PROGRESS NOTES
TRANSFER - OUT REPORT:    Verbal report given to CHET Valadez on Alondra REYNA Ho    Report consisted of patient's Situation, Background, Assessment and   Recommendations(SBAR).     Information from the following report(s) Nurse Handoff Report was reviewed with the receiving nurse.    Opportunity for questions and clarification was provided.

## 2025-01-14 NOTE — CONSULTS
Time: 01/13/25  9:25 PM   Result Value Ref Range     (H) 100 - 190 U/L   Protime-INR    Collection Time: 01/13/25  9:25 PM   Result Value Ref Range    Protime 18.4 (H) 11.7 - 14.5 sec    INR 1.5    Protein, Total    Collection Time: 01/13/25  9:25 PM   Result Value Ref Range    Total Protein 6.5 6.4 - 8.2 g/dL   Hepatic function panel    Collection Time: 01/14/25  4:45 AM   Result Value Ref Range    Albumin 3.2 (L) 3.4 - 5.0 g/dL    Alkaline Phosphatase 71 40 - 129 U/L    ALT 8 (L) 10 - 40 U/L    AST 24 15 - 37 U/L    Total Bilirubin 1.1 (H) 0.0 - 1.0 mg/dL    Bilirubin, Direct 0.6 (H) 0.0 - 0.3 mg/dL    Bilirubin, Indirect 0.5 0.0 - 0.7 mg/dL    Total Protein 6.0 (L) 6.4 - 8.2 g/dL    Albumin/Globulin Ratio 1.2 1.1 - 2.2   Lipid Panel    Collection Time: 01/14/25  4:45 AM   Result Value Ref Range    Cholesterol, Total 70 (L) 125 - 199 mg/dL    HDL 19 (L) >40 mg/dL    LDL Cholesterol 38 <101 mg/dL    Triglycerides 65 <150 mg/dL       The ASCVD Risk score (Thomas OLIVEIRA, et al., 2019) failed to calculate for the following reasons:    The valid HDL cholesterol range is 20 to 100 mg/dL    The valid total cholesterol range is 130 to 320 mg/dL     Assessment/Recommendations:     Congestive heart failure patient's BNP is markedly elevated will obtain an echocardiogram today for further assessment    COPD exacerbation continue with bronchodilators    Has EKG changes in the inferior lateral leads, troponin mildly elevated will probably need ischemia workup as well    Will obtain a Lexiscan as well                 Cassidy Blankenship MD, 1/14/2025 7:11 AM       Please note this report has been partially produced using speech recognition software and may contain errors related to that system including errors in grammar, punctuation, and spelling, as well as words and phrases that may be inappropriate. If there are any questions or concerns please feel free to contact the dictating provider for clarification.

## 2025-01-14 NOTE — BRIEF OP NOTE
Department of Interventional Radiology Post-Procedure Note      Date: 1/14/2025     Interventional Radiologist: Prasanth    Procedure Performed:  right thoracentesis    H&P Status: H&P was reviewed, the patient was examined and no change has occurred in patient's condition since H&P was completed.    Pre-procedure Diagnosis:  right pleural effusion    Post-procedure Diagnosis:  Same    Sedation:  none    Contrast used:  none    Estimated blood loss:  Minimal    Preliminary Findings:  Successful    Complications:  none    Full Report to Follow.    Electronically signed by Brittany Rader MD on 1/14/2025 at 9:37 AM

## 2025-01-14 NOTE — PROGRESS NOTES
Spiritual Health History and Assessment/Progress Note  [facility]    Loneliness/Social Isolation,  ,  ,      Name: Alondra Ho MRN: [id number]    Age: 64 y.o.     Sex: female   Language: English   Yazidi: Unknown   Acute on chronic respiratory failure with hypoxia     Date: 1/14/2025            Total Time Calculated: 14 min              Spiritual Assessment began in [facility] ICU STEPDOWN        Referral/Consult From: Rounding   Encounter Overview/Reason: Loneliness/Social Isolation  Service Provided For: Patient    Jany, Belief, Meaning:   Patient identifies as spiritual and has beliefs or practices that help with coping during difficult times  Family/Friends No family/friends present      Importance and Influence:  Patient has spiritual/personal beliefs that influence decisions regarding their health  Family/Friends No family/friends present    Community:  Patient feels well-supported. Support system includes: Children and Extended family  Family/Friends No family/friends present    Assessment and Plan of Care:     Patient Interventions include: Facilitated expression of thoughts and feelings and Explored spiritual coping/struggle/distress  Family/Friends Interventions include: No family/friends present    Patient Plan of Care: Spiritual Care available upon further referral  Family/Friends Plan of Care: No family/friends present    Electronically signed by Chaplain Alessandro on 1/14/2025 at 11:11 AM

## 2025-01-15 LAB
ANION GAP SERPL CALCULATED.3IONS-SCNC: 11 MMOL/L (ref 9–17)
ANION GAP SERPL CALCULATED.3IONS-SCNC: 11 MMOL/L (ref 9–17)
ANION GAP SERPL CALCULATED.3IONS-SCNC: 12 MMOL/L (ref 9–17)
BNP SERPL-MCNC: 2028 PG/ML (ref 0–125)
BUN SERPL-MCNC: 13 MG/DL (ref 7–20)
BUN SERPL-MCNC: 13 MG/DL (ref 7–20)
BUN SERPL-MCNC: 16 MG/DL (ref 7–20)
CALCIUM SERPL-MCNC: 8.8 MG/DL (ref 8.3–10.6)
CALCIUM SERPL-MCNC: 8.9 MG/DL (ref 8.3–10.6)
CALCIUM SERPL-MCNC: 9.1 MG/DL (ref 8.3–10.6)
CHLORIDE SERPL-SCNC: 88 MMOL/L (ref 99–110)
CHLORIDE SERPL-SCNC: 88 MMOL/L (ref 99–110)
CHLORIDE SERPL-SCNC: 95 MMOL/L (ref 99–110)
CO2 SERPL-SCNC: 35 MMOL/L (ref 21–32)
CO2 SERPL-SCNC: 36 MMOL/L (ref 21–32)
CO2 SERPL-SCNC: 36 MMOL/L (ref 21–32)
CREAT SERPL-MCNC: 0.8 MG/DL (ref 0.6–1.2)
CREAT SERPL-MCNC: 0.9 MG/DL (ref 0.6–1.2)
CREAT SERPL-MCNC: 0.9 MG/DL (ref 0.6–1.2)
GFR, ESTIMATED: 64 ML/MIN/1.73M2
GFR, ESTIMATED: 66 ML/MIN/1.73M2
GFR, ESTIMATED: 71 ML/MIN/1.73M2
GLUCOSE SERPL-MCNC: 109 MG/DL (ref 74–99)
GLUCOSE SERPL-MCNC: 116 MG/DL (ref 74–99)
GLUCOSE SERPL-MCNC: 133 MG/DL (ref 74–99)
MAGNESIUM SERPL-MCNC: 1.6 MG/DL (ref 1.8–2.4)
MAGNESIUM SERPL-MCNC: 1.7 MG/DL (ref 1.8–2.4)
MAGNESIUM SERPL-MCNC: 2.3 MG/DL (ref 1.8–2.4)
POTASSIUM SERPL-SCNC: 3 MMOL/L (ref 3.5–5.1)
POTASSIUM SERPL-SCNC: 3.3 MMOL/L (ref 3.5–5.1)
POTASSIUM SERPL-SCNC: 3.5 MMOL/L (ref 3.5–5.1)
POTASSIUM SERPL-SCNC: 3.6 MMOL/L (ref 3.5–5.1)
SODIUM SERPL-SCNC: 136 MMOL/L (ref 136–145)
SODIUM SERPL-SCNC: 137 MMOL/L (ref 136–145)
SODIUM SERPL-SCNC: 141 MMOL/L (ref 136–145)

## 2025-01-15 PROCEDURE — 94761 N-INVAS EAR/PLS OXIMETRY MLT: CPT

## 2025-01-15 PROCEDURE — 6370000000 HC RX 637 (ALT 250 FOR IP): Performed by: STUDENT IN AN ORGANIZED HEALTH CARE EDUCATION/TRAINING PROGRAM

## 2025-01-15 PROCEDURE — 36415 COLL VENOUS BLD VENIPUNCTURE: CPT

## 2025-01-15 PROCEDURE — 94010 BREATHING CAPACITY TEST: CPT

## 2025-01-15 PROCEDURE — 2060000000 HC ICU INTERMEDIATE R&B

## 2025-01-15 PROCEDURE — 6360000002 HC RX W HCPCS

## 2025-01-15 PROCEDURE — APPNB15 APP NON BILLABLE TIME 0-15 MINS

## 2025-01-15 PROCEDURE — 80048 BASIC METABOLIC PNL TOTAL CA: CPT

## 2025-01-15 PROCEDURE — 84132 ASSAY OF SERUM POTASSIUM: CPT

## 2025-01-15 PROCEDURE — 83880 ASSAY OF NATRIURETIC PEPTIDE: CPT

## 2025-01-15 PROCEDURE — 99222 1ST HOSP IP/OBS MODERATE 55: CPT | Performed by: INTERNAL MEDICINE

## 2025-01-15 PROCEDURE — 6360000002 HC RX W HCPCS: Performed by: STUDENT IN AN ORGANIZED HEALTH CARE EDUCATION/TRAINING PROGRAM

## 2025-01-15 PROCEDURE — 99232 SBSQ HOSP IP/OBS MODERATE 35: CPT | Performed by: INTERNAL MEDICINE

## 2025-01-15 PROCEDURE — 6360000002 HC RX W HCPCS: Performed by: INTERNAL MEDICINE

## 2025-01-15 PROCEDURE — 6370000000 HC RX 637 (ALT 250 FOR IP): Performed by: INTERNAL MEDICINE

## 2025-01-15 PROCEDURE — 2700000000 HC OXYGEN THERAPY PER DAY

## 2025-01-15 PROCEDURE — 83735 ASSAY OF MAGNESIUM: CPT

## 2025-01-15 PROCEDURE — 6370000000 HC RX 637 (ALT 250 FOR IP)

## 2025-01-15 PROCEDURE — 2500000003 HC RX 250 WO HCPCS: Performed by: INTERNAL MEDICINE

## 2025-01-15 RX ORDER — POTASSIUM CHLORIDE 1500 MG/1
40 TABLET, EXTENDED RELEASE ORAL 2 TIMES DAILY
Status: COMPLETED | OUTPATIENT
Start: 2025-01-15 | End: 2025-01-15

## 2025-01-15 RX ORDER — MAGNESIUM SULFATE IN WATER 40 MG/ML
2000 INJECTION, SOLUTION INTRAVENOUS ONCE
Status: COMPLETED | OUTPATIENT
Start: 2025-01-15 | End: 2025-01-15

## 2025-01-15 RX ORDER — BUMETANIDE 0.25 MG/ML
1 INJECTION, SOLUTION INTRAMUSCULAR; INTRAVENOUS 2 TIMES DAILY
Status: DISCONTINUED | OUTPATIENT
Start: 2025-01-15 | End: 2025-01-16

## 2025-01-15 RX ORDER — CARVEDILOL 6.25 MG/1
6.25 TABLET ORAL 2 TIMES DAILY WITH MEALS
Status: DISCONTINUED | OUTPATIENT
Start: 2025-01-15 | End: 2025-01-18 | Stop reason: HOSPADM

## 2025-01-15 RX ADMIN — ALPRAZOLAM 0.5 MG: 0.5 TABLET ORAL at 20:37

## 2025-01-15 RX ADMIN — PRAMIPEXOLE DIHYDROCHLORIDE 0.12 MG: 0.25 TABLET ORAL at 20:30

## 2025-01-15 RX ADMIN — ENOXAPARIN SODIUM 40 MG: 100 INJECTION SUBCUTANEOUS at 08:05

## 2025-01-15 RX ADMIN — MAGNESIUM SULFATE HEPTAHYDRATE 2000 MG: 40 INJECTION, SOLUTION INTRAVENOUS at 04:38

## 2025-01-15 RX ADMIN — POTASSIUM CHLORIDE 40 MEQ: 1500 TABLET, EXTENDED RELEASE ORAL at 20:30

## 2025-01-15 RX ADMIN — POTASSIUM CHLORIDE 40 MEQ: 1500 TABLET, EXTENDED RELEASE ORAL at 08:04

## 2025-01-15 RX ADMIN — POLYETHYLENE GLYCOL (3350) 17 G: 17 POWDER, FOR SOLUTION ORAL at 11:36

## 2025-01-15 RX ADMIN — ESCITALOPRAM OXALATE 10 MG: 10 TABLET ORAL at 08:05

## 2025-01-15 RX ADMIN — EMPAGLIFLOZIN 10 MG: 10 TABLET, FILM COATED ORAL at 17:42

## 2025-01-15 RX ADMIN — QUETIAPINE FUMARATE 50 MG: 25 TABLET ORAL at 20:30

## 2025-01-15 RX ADMIN — CARVEDILOL 6.25 MG: 6.25 TABLET, FILM COATED ORAL at 17:42

## 2025-01-15 RX ADMIN — CARVEDILOL 3.12 MG: 6.25 TABLET, FILM COATED ORAL at 08:05

## 2025-01-15 RX ADMIN — BUMETANIDE 1 MG: 0.25 INJECTION INTRAMUSCULAR; INTRAVENOUS at 17:41

## 2025-01-15 RX ADMIN — SODIUM CHLORIDE, PRESERVATIVE FREE 10 ML: 5 INJECTION INTRAVENOUS at 20:30

## 2025-01-15 ASSESSMENT — COPD QUESTIONNAIRES
QUESTION8_ENERGYLEVEL: 5
TOTAL_EXACERBATIONS_PASTYEAR: 2
QUESTION6_LEAVINGHOUSE: 5
QUESTION7_SLEEPQUALITY: 0
GOLD_GROUP: GROUP E
QUESTION4_WALKINCLINE: 5
QUESTION3_CHESTTIGHTNESS: 1
QUESTION5_HOMEACTIVITIES: 4
QUESTION1_COUGHFREQUENCY: 3
QUESTION2_CHESTPHLEGM: 2
CAT_TOTALSCORE: 25

## 2025-01-15 ASSESSMENT — PAIN SCALES - GENERAL
PAINLEVEL_OUTOF10: 0
PAINLEVEL_OUTOF10: 0

## 2025-01-15 ASSESSMENT — PULMONARY FUNCTION TESTS
PIF_VALUE: 90
FEV1 (%PREDICTED): 71
POST BRONCHODILATOR FEV1/FVC: 100

## 2025-01-15 NOTE — PROGRESS NOTES
In-Patient Progress Note    Patient:  Alondra Ho 64 y.o. female MRN: [id number]     Date of Service: 1/15/2025    Hospital Day: 3      Chief complaint: had concerns including Shortness of Breath.      Subjective   Patient seen and examined in the morning. Patient states she feels a little better. SOB is a little better. Has made 4800 mL urine output in the last 24 hours.       See ROS    I have reviewed all pertinent PMHx, PSHx, FamHx, SocialHx, medications, and allergies and updated history as appropriate. I have reviewed images as appropriate.     Assessment and Plan   Alondra Ho, a 64 y.o. female, with a history of HTN, HLD, mood disorder, class I obesity, chronic resp failure with hypoxia on 2-3L/min at baseline, COPD with pulm fibrosis, Leg swelling  was admitted on 1/13/2025 with complaints of had concerns including Shortness of Breath.      Assessment  Acute On Chronic Resp Failure with Hypoxia   Baseline 2-3L/min   Placed on vapotherm in the ER for SpO2 down to 50%~  SpO2 drop worse with exertion   Vapotherm down to 50% FiO2   PE ruled out      Acute Heart failure vs Acute on Chronic Heart failure   Was on lasix till 1/10/2025 per patient but stopped taking   CT suggestive of pulm edema   BNP 6895  Had leg swelling - now improved post lasix in the ER   On bumex drip      Lt. Pleural Effusion s/p Thoracentesis   Hypokalemia, Hypomagnesemia  a. K 3.5, Mg 1.7    Elevated Troponin 2/2 likely Demand Ischemia 2/2 Resp failure and heart failure   Mild Troponin elevation 2/2 possibly heart failure vs Ischemia   Pulm HTN   H/O COPD with Pulm Fibrosis and Pulm HTN   H/O HTN, HLD   H/O Mood Disorder   Class I obesity         Plan  Bumex drip - defer to cardiology   On coreg currently   BMP, Mg Q12H   F/U Cardiology and pulm   Stress test once O2 requirements improve   Replace K with PO K and Mg with IV mg   Breathing treatment   Resume home meds, see orders   Ok with SpO2 > 88%      # Peptic ulcer  prophylaxis: -   # DVT Prophylaxis: Lovenox   Abhay, spouse, to help with decision making       Expected Disposition: Home   Estimated discharge date: 2~ days     Personally reviewed Lab Studies and Imaging     Discussed management of the case with IDR team - here on bumex drip and vapotherm     Drugs that require monitoring for toxicity include bumex drip and the method of monitoring was Cr and K   Monitor lovenox with CBC  Monitor Kcl with K and Cl   Monitor MgSO4 with Mg level     Review of System     Review of Systems  See subjection section     I have reviewed all pertinent PMHx, PSHx, FamHx, SocialHx, medications, and allergies and updated history as appropriate.    Physical Exam   VITAL SIGNS:  /61   Pulse 67   Temp 97.6 °F (36.4 °C) (Oral)   Resp 22   Ht 1.575 m (5' 2\")   Wt 75.2 kg (165 lb 12.6 oz)   SpO2 96%   BMI 30.32 kg/m²   Tmax over 24 hours:  Temp (24hrs), Av.4 °F (36.3 °C), Min:96.4 °F (35.8 °C), Max:97.7 °F (36.5 °C)      Patient Vitals for the past 6 hrs:   BP Temp Temp src Pulse Resp SpO2 Weight   01/15/25 0847 -- -- -- 67 22 96 % --   01/15/25 0802 101/61 97.6 °F (36.4 °C) Oral 67 21 93 % --   01/15/25 0538 -- -- -- -- -- -- 75.2 kg (165 lb 12.6 oz)         Intake/Output Summary (Last 24 hours) at 1/15/2025 1019  Last data filed at 1/15/2025 0651  Gross per 24 hour   Intake 590 ml   Output 4800 ml   Net -4210 ml     Wt Readings from Last 2 Encounters:   01/15/25 75.2 kg (165 lb 12.6 oz)   07/10/24 74.8 kg (165 lb)     Body mass index is 30.32 kg/m².      Physical Exam  Vitals and nursing note reviewed.   Constitutional:       General: She is not in acute distress.     Appearance: She is not ill-appearing, toxic-appearing or diaphoretic.   HENT:      Head: Normocephalic and atraumatic.      Right Ear: External ear normal.      Left Ear: External ear normal.      Nose: Nose normal.      Mouth/Throat:      Pharynx: Oropharynx is clear. No oropharyngeal exudate or posterior

## 2025-01-15 NOTE — PROGRESS NOTES
Switched pt to a regular high flow nasal cannula at 4L with 02 saturations at 96-97%.  Vapotherm on standby.

## 2025-01-15 NOTE — PROGRESS NOTES
Current GOLD classification       GOLD Stage:    Group:  E  Recorded domestic exacerbations past 12 months:  2  Current recorded COPD Assessment Tool (CAT) score of  25  Current eosinophil count: 0.4     Inhaler Device   Acceptable for Use   Respimat  Not Breath Actuated Yes   MDI  Not Breath Actuated Yes           DPI  Observed PIF   using  In-Check Meter   Optimal PIF   Acceptable for Use   HANDIHALER 90 >30 Y   Pressair 90 >45 Y   NEOHALER 90 >50 Y   Diskus 90 >60 Y   ELLIPTA 90 >60 Y     Records show this patient was using ALBUTEROL maintenance therapy prior to admission.          LONG-ACTING (LABA)   Arformoterol (Brovana) NEBULIZER   Indacaterol (Arcapta) NEOHALER   Olodaterol (Striverdi) Respimat   Salmeterol (Serevent) MDI, DISKUS   LONG-ACTING (LAMA)   Aclidinium bromide (Tudorza) PRESSAIR   Glycopyrronium bromide (Seebri) NEOHALER   Tiotropium (Spiriva) Respimat, HANDIHALER   Umeclidinium (Incruse) ELLIPTA   (LABA/LAMA)   Formoterol/glycopyrronium (Bevespi) MDI   Indacaterol/glycopyrronium (Utibron) NEOHALER   Vilanterol/umeclidinium (Anoro) ELLIPTA   Olodaterol/tiotropium (Stiolto) Respimat   (LABA/ICS)   Formoterol/budesomide (Symbicort) MDI   Formoterol/mometasone (Dulera) MDI   Salmeterol/fluticasone (Advair) MDI, DISKUS   Vilanterol/fluticasone (Breo) ELLIPTA   (LABA/LAMA/ICS)   Fluticasone/umeclidinium/vilanterol (Trelegy) ELLIPTA   Budesonide/glycopyrrolate/formoterol fumarate (Beztri) aerosphere      01/15/25 1514   Spirometry Assessment   FEV1 (%PRED) 71   Post Bronchodilator FEV/   COPD Exacerbations in last year 2   PIF 90 L/min   COPD Assessment (CAT Score)   Cough Assessment 3   Phlegm Assessment 2   Chest tightness 1   Walking on an incline 5   Home Activities 4   Confident Leaving The Home 5   Sleeping Soundly 0   Have Energy 5   Assessment Score 25   $RT COPD Assessment Yes   GOLD Staging   Group Group E

## 2025-01-15 NOTE — PROGRESS NOTES
Physician Progress Note      PATIENT:               EFRAÍN SUAZO  CSN #:                  [id number]  :                       1960  ADMIT DATE:       2025 10:34 AM  DISCH DATE:  RESPONDING  PROVIDER #:        Deshaun Rodgers MD          QUERY TEXT:    Pt admitted with SOB and has CHF documented. If possible, please document in   progress notes and discharge summary further specificity regarding the type   and acuity of CHF:    The medical record reflects the following:  Risk Factors: 64 y.o female with pMHx of COPD, HTN.  Clinical Indicators:  - BNP - 6895,  - ECHO - EF - 55-60%, Septal   flattening in diastole and systole consistent with right ventricular volume   and pressure overload.  - IM H+P - Acute Heart failure vs Acute on Chronic   Heart failure: Was on lasix till 1/10/2025 per patient but stopped taking.    - IM - On bumex drip. Elevated Troponin 2/2 likely Demand Ischemia 2/2   Resp failure and heart failure.  - Cards - Congestive heart failure   patient's BNP is markedly elevated will obtain an echocardiogram today for   further assessment.  Treatment: IV bumex gtt, ECHO, Cards consult, Troponin, BNP, EKG, telemetry,   thoracentesis.  Options provided:  -- This patient is in acute on chronic diastolic CHF/HFpEF.  -- This patient is in acute diastolic CHF/HFpEF.  -- Other - I will add my own diagnosis  -- Disagree - Not applicable / Not valid  -- Disagree - Clinically unable to determine / Unknown  -- Refer to Clinical Documentation Reviewer    PROVIDER RESPONSE TEXT:    This patient is in acute on chronic diastolic CHF/HFpEF    Query created by: Harpal Betts on 1/15/2025 7:19 AM      Electronically signed by:  Deshaun Rodgers MD 1/15/2025 11:54 AM

## 2025-01-15 NOTE — PROGRESS NOTES
[place] [address]  Phone: [phone]    Fax [phone]                  Cassidy Blankenship MD, [facility]       Carlos Rodriguez MD, [facility]  Jennifer Villanueva MD, [facility]    MD Brittany Doan MD Tariq Rizvi, MD Bilal Alam, MD Dr. Waseem Sajjad MD Melissa Kellis, APRMARY Mays, MADY Kaur, APRMARY Alvarado, APRMARY West PA-C    CARDIOLOGY  NOTE      Name:  Alondra Ho /Age/Sex: 1960  (64 y.o. female)   MRN & CSN:  [id number] & [id number] Admission Date/Time: 2025 10:34 AM   Location:  -A PCP: Grzegorz Quach MD       Hospital Day: 3    - Cardiology consult is for:  CHF      ASSESSMENT/ PLAN:  Acute on chronic right-sided heart failure  Severe tricuspid regurgitation  Elevated troponin  Hypokalemia  -Volume overload improving.  -3.9 L overnight.  -6.4 L total.  -9 pounds  -Thoracentesis : -700 cc  -Echo showing preserved EF with right-sided volume overload.  Severely dilated RV and RA.  Severe TR. right heart failure  -Consider stress test to rule out ischemic cause upon improvement of respiratory failure  -Increase Coreg to 6.25 mg twice daily  -Start Jardiance 10 mg daily  -Switch Bumex drip to Bumex 1 mg twice daily.  Monitor potassium closely  COPD exacerbation: Baseline O2 requirement 2-3 L  Pulmonary fibrosis  -Likely cause of right-sided heart failure  -Per primary team            Subjective:  Alondra is a 64 y.o.year old   Patient continues to have shortness of breath however states it is improving.        Objective: Temperature:  Current - Temp: 98.7 °F (37.1 °C); Max - Temp  Av.6 °F (36.4 °C)  Min: 96.4 °F (35.8 °C)  Max: 98.7 °F (37.1 °C)    Respiratory Rate : Resp  Av.1  Min: 18  Max: 28    Pulse Range: Pulse  Av  Min: 63  Max: 73    Blood Presuure Range:  Systolic (24hrs), Av , Min:101 , Max:113   ; Diastolic  )     albuterol, albuterol sulfate HFA, sodium chloride flush, sodium chloride, ondansetron **OR** ondansetron, polyethylene glycol, acetaminophen **OR** acetaminophen, potassium chloride **OR** potassium alternative oral replacement **OR** potassium chloride, magnesium sulfate, ALPRAZolam    Lab Data:  CBC:   Recent Labs     25  1045   WBC 10.6*   HGB 16.1*   HCT 51.8*   MCV 90.2        BMP:   Recent Labs     25  0944 01/15/25  0151 01/15/25  0856    141 137   K 3.1* 3.5 3.0*   CL 95* 95* 88*   CO2 33* 35* 36*   BUN 16 16 13   CREATININE 0.9 0.9 0.8     LIVER PROFILE:   Recent Labs     25  0445   AST 24   ALT 8*   BILIDIR 0.6*   BILITOT 1.1*   ALKPHOS 71     PT/INR:   Recent Labs     25  2125   PROTIME 18.4*   INR 1.5     APTT: No results for input(s): \"APTT\" in the last 72 hours.  BNP:  No results for input(s): \"BNP\" in the last 72 hours.  TROPONIN: No results for input(s): \"TROPONINT\" in the last 72 hours.  Labs, consult, tests reviewed          Florentino West PA-C 1/15/2025 2:03 PM     I have seen ,spoken to  and examined this patient personally, independently of the SAMANTHA. I have reviewed the hospital care given to date and reviewed all pertinent labs and imaging.I have spoken with patient, nursing staff and provided written and verbal instructions .The above note has been reviewed     CARDIOLOGY ATTENDING ADDENDUM    HPI:  I have reviewed the above HPI  And agree with above     Pulse Range: Pulse  Av  Min: 63  Max: 73    Blood Presuure Range:  Systolic (24hrs), Av , Min:101 , Max:113   ; Diastolic (24hrs), Av, Min:61, Max:83      Pulse ox Range: SpO2  Av.2 %  Min: 93 %  Max: 98 %    24hr I & O:    Intake/Output Summary (Last 24 hours) at 1/15/2025 1419  Last data filed at 1/15/2025 1133  Gross per 24 hour   Intake 590 ml   Output 5700 ml   Net -5110 ml         /71   Pulse 73   Temp 98.7 °F (37.1 °C) (Oral)   Resp 22   Ht 1.575 m (5' 2\")

## 2025-01-15 NOTE — CARE COORDINATION
CM reviewed chart and discussed in IDR. Pt is not medically ready for discharge at this time. Plan remains home with no needs.

## 2025-01-15 NOTE — PLAN OF CARE
Problem: Discharge Planning  Goal: Discharge to home or other facility with appropriate resources  1/15/2025 0335 by Rosio Zapata RN  Outcome: Progressing  1/14/2025 1613 by Rory Manzano RN  Outcome: Progressing     Problem: Pain  Goal: Verbalizes/displays adequate comfort level or baseline comfort level  1/15/2025 0335 by Rosio Zapata RN  Outcome: Progressing  1/14/2025 1613 by Rory Manzano RN  Outcome: Progressing     Problem: Skin/Tissue Integrity  Goal: Absence of new skin breakdown  Description: 1.  Monitor for areas of redness and/or skin breakdown  2.  Assess vascular access sites hourly  3.  Every 4-6 hours minimum:  Change oxygen saturation probe site  4.  Every 4-6 hours:  If on nasal continuous positive airway pressure, respiratory therapy assess nares and determine need for appliance change or resting period.  1/15/2025 0335 by Rosio Zapata RN  Outcome: Progressing  1/14/2025 1613 by Rory Manzano RN  Outcome: Progressing     Problem: Safety - Adult  Goal: Free from fall injury  1/15/2025 0335 by Rosio Zapata RN  Outcome: Progressing  1/14/2025 1613 by Rory Manzano RN  Outcome: Progressing     Problem: Respiratory - Adult  Goal: Achieves optimal ventilation and oxygenation  1/15/2025 0335 by Rosio Zapata RN  Outcome: Progressing  1/14/2025 1613 by Rory Manzano RN  Outcome: Progressing     Problem: Cardiovascular - Adult  Goal: Maintains optimal cardiac output and hemodynamic stability  1/15/2025 0335 by Rosio Zapata RN  Outcome: Progressing  1/14/2025 1613 by Rory Manzano RN  Outcome: Progressing  Goal: Absence of cardiac dysrhythmias or at baseline  1/15/2025 0335 by Rosio Zapata RN  Outcome: Progressing  1/14/2025 1613 by Rory Manzano RN  Outcome: Progressing     Problem: Skin/Tissue Integrity - Adult  Goal:

## 2025-01-15 NOTE — CONSULTS
Pulmonary Consult Note      Reason for Consult: Severe pulm htn    Requesting Physician:     Deshaun Rodgers MD       Subjective:   CHIEF COMPLAINT :SOB    Patient Active Problem List    Diagnosis Date Noted    Restless legs syndrome 01/11/2018     Priority: Medium    Acute on chronic respiratory failure with hypoxia 01/13/2025    Chronic obstructive pulmonary disease (HCC) 07/10/2024    Hypoxemia 07/10/2024    ILD (interstitial lung disease) (HCC) 07/10/2024    Acute respiratory failure with hypoxia 06/28/2024    Pure hypercholesterolemia 01/02/2019    Osteopenia of lumbar spine 01/02/2019    Marijuana use 01/02/2019    Bilateral hip pain 05/14/2018    Allergic rhinitis 11/17/2017    Ulnar neuritis 09/19/2017    Back muscle spasm 05/04/2017    Osteoarthritis 07/08/2016    Essential hypertension 07/08/2016    Major depressive disorder, recurrent episode (HCC) 05/24/2016     Overview Note:     Replacing Inactive Diagnoses      Tobacco abuse 05/24/2016        HPI:                The patient is a 64 y.o. female with significant past medical history of HTN, HLD, mood disorder, class I obesity, chronic resp failure with hypoxia on 2-3L/min at baseline, COPD with pulm fibrosis, Leg swelling   presents with complaints of SOB and Cough x 3 days, no fever, no chest pain, SOBOE. She is on Lasix. Her CXR showed large right pleural effusion which has been drained. She is on Diuresis.She was consulted for the Pulmonary HTN. She has combined Pulmonary Fibrosis and Emphysema    Past Medical History:      Diagnosis Date    Allergic rhinitis     flowers    Anxiety     Arthritis     left hip    Chronic back pain     Chronic obstructive pulmonary disease (HCC) 7/10/2024    Depression     Essential hypertension 7/8/2016    Fall     \"fell coming in the door at home on 12/11/2015- went to ER- put brace on and sent me home then 12/13/2015 fell again- rehurt my left knee- for surgery\"( 12/17/2015\")    Hyperlipidemia 10/28/2016     emphysema and fibrosis pattern within the lungs  4.  There are findings suggestive of pulmonary artery hypertension with elevated   right heart pressures.    Assessment/Plan       Patient Active Problem List    Diagnosis Date Noted    Restless legs syndrome 01/11/2018     Priority: Medium    Acute on chronic respiratory failure with hypoxia 01/13/2025    Chronic obstructive pulmonary disease (HCC) 07/10/2024    Hypoxemia 07/10/2024    ILD (interstitial lung disease) (HCC) 07/10/2024    Acute respiratory failure with hypoxia 06/28/2024    Pure hypercholesterolemia 01/02/2019    Osteopenia of lumbar spine 01/02/2019    Marijuana use 01/02/2019    Bilateral hip pain 05/14/2018    Allergic rhinitis 11/17/2017    Ulnar neuritis 09/19/2017    Back muscle spasm 05/04/2017    Osteoarthritis 07/08/2016    Essential hypertension 07/08/2016    Major depressive disorder, recurrent episode (HCC) 05/24/2016     Overview Note:     Replacing Inactive Diagnoses      Tobacco abuse 05/24/2016     Acute on chronic Hypoxic Resp failure  Chronic Metabolic Alkalosis  CPFE  Severe Pulmonary HTN sec to CPFE  Large Right Pleural effusion s/p thoracentesis  HTN  HLD  Obesity  ? HANNAH      PLAN  F/u Pleural fluid analysis  Diuresis  Inhalers  CXR in am  Keep sats > 92%  OP PSG  OP Follow up for Pulmonary HTN work up  DVT and GI Prophylaxis  C.w present management              Total time spent for this encounter: 55 mins    Electronically signed by Ta Jennings MD on 1/15/2025 at 12:06 PM

## 2025-01-15 NOTE — PROGRESS NOTES
Met with patient and introduced myself as the Heart failure education R.N.   Patient lives at home with granddaughter. Grand daughter is supportive and does most of the shopping and cooking. Patient states gr. Dgt has been trying to improve patient's diet and limit sodium.    Patient has COPD and uses home oxygen.    Admitting diagnosis- acute on chronic respiratory failure Cardiologist- Pattie  Heart Failure Education Nurse consulted- yes   Ejection fraction 55 % as of 1/14/2025  Pro BNP- 6,895 on 1/13/2025  Hospital follow up appt-  patient needs to coordinate ride with family. Instructed to call for f/u appt within 7- 10 days of discharge  Smoking Cessation information and referral- recently quit   Pneumonia vaccine- overdue- discussed   PCP- [place]  Patient has a digital scale? Yes   Transportation- family assists     Able to obtain medications without difficulty?- Yes- Patient denies difficulty in obtaining or taking medications. Advised not to stop taking a medication without notifying provider.      Reviewed Heart failure patient education book with patient. Heart failure education included: Type of heart failure, How it is diagnosed, causes, symptoms, medications, diet, daily weights, exercise and fluid control.   Questions answered.      Patient's heart failure medications reviewed and information given on each.     Reviewed the Stop Light Handout with patient and instructed when to call her provider.   Patient was engaged and attentive during education session.    The following handouts were reviewed with patient and patient was given a copy of the following : Heart Failure education booklet, the 'Stop Light' Handout,  a link to the [employer]'s Healthier Living with Heart Failure Interactive workbook and a list of heart failure related education available on the hospital TV and how to access it.         1 hour of education provided.

## 2025-01-15 NOTE — PLAN OF CARE
This patient is still on hold due to the patient being on Vapotherm.     Marie Ballard [facility]

## 2025-01-16 ENCOUNTER — APPOINTMENT (OUTPATIENT)
Dept: GENERAL RADIOLOGY | Age: 65
End: 2025-01-16
Payer: COMMERCIAL

## 2025-01-16 LAB
ALBUMIN SERPL-MCNC: 3.4 G/DL (ref 3.4–5)
ALBUMIN/GLOB SERPL: 1.1 {RATIO} (ref 1.1–2.2)
ALP SERPL-CCNC: 77 U/L (ref 40–129)
ALT SERPL-CCNC: 5 U/L (ref 10–40)
ANION GAP SERPL CALCULATED.3IONS-SCNC: 7 MMOL/L (ref 9–17)
ANION GAP SERPL CALCULATED.3IONS-SCNC: 9 MMOL/L (ref 9–17)
ANION GAP SERPL CALCULATED.3IONS-SCNC: 9 MMOL/L (ref 9–17)
AST SERPL-CCNC: 15 U/L (ref 15–37)
BASOPHILS # BLD: 0.03 K/UL
BASOPHILS NFR BLD: 0 % (ref 0–1)
BILIRUB SERPL-MCNC: 1.4 MG/DL (ref 0–1)
BUN SERPL-MCNC: 13 MG/DL (ref 7–20)
BUN SERPL-MCNC: 13 MG/DL (ref 7–20)
BUN SERPL-MCNC: 14 MG/DL (ref 7–20)
CALCIUM SERPL-MCNC: 9.3 MG/DL (ref 8.3–10.6)
CHLORIDE SERPL-SCNC: 89 MMOL/L (ref 99–110)
CHLORIDE SERPL-SCNC: 90 MMOL/L (ref 99–110)
CHLORIDE SERPL-SCNC: 90 MMOL/L (ref 99–110)
CO2 SERPL-SCNC: 36 MMOL/L (ref 21–32)
CO2 SERPL-SCNC: 38 MMOL/L (ref 21–32)
CO2 SERPL-SCNC: 40 MMOL/L (ref 21–32)
CREAT SERPL-MCNC: 0.9 MG/DL (ref 0.6–1.2)
CREAT SERPL-MCNC: 0.9 MG/DL (ref 0.6–1.2)
CREAT SERPL-MCNC: 1 MG/DL (ref 0.6–1.2)
EOSINOPHIL # BLD: 0.08 K/UL
EOSINOPHILS RELATIVE PERCENT: 1 % (ref 0–3)
ERYTHROCYTE [DISTWIDTH] IN BLOOD BY AUTOMATED COUNT: 16.5 % (ref 11.7–14.9)
GFR, ESTIMATED: 58 ML/MIN/1.73M2
GFR, ESTIMATED: 60 ML/MIN/1.73M2
GFR, ESTIMATED: 63 ML/MIN/1.73M2
GLUCOSE SERPL-MCNC: 100 MG/DL (ref 74–99)
GLUCOSE SERPL-MCNC: 103 MG/DL (ref 74–99)
GLUCOSE SERPL-MCNC: 103 MG/DL (ref 74–99)
HCT VFR BLD AUTO: 50.6 % (ref 37–47)
HGB BLD-MCNC: 15.7 G/DL (ref 12.5–16)
IMM GRANULOCYTES # BLD AUTO: 0.05 K/UL
IMM GRANULOCYTES NFR BLD: 1 %
LYMPHOCYTES NFR BLD: 0.91 K/UL
LYMPHOCYTES RELATIVE PERCENT: 9 % (ref 24–44)
MAGNESIUM SERPL-MCNC: 1.6 MG/DL (ref 1.8–2.4)
MAGNESIUM SERPL-MCNC: 1.6 MG/DL (ref 1.8–2.4)
MAGNESIUM SERPL-MCNC: 2.1 MG/DL (ref 1.8–2.4)
MCH RBC QN AUTO: 27.8 PG (ref 27–31)
MCHC RBC AUTO-ENTMCNC: 31 G/DL (ref 32–36)
MCV RBC AUTO: 89.7 FL (ref 78–100)
MONOCYTES NFR BLD: 0.84 K/UL
MONOCYTES NFR BLD: 8 % (ref 0–4)
NEUTROPHILS NFR BLD: 82 % (ref 36–66)
NEUTS SEG NFR BLD: 8.77 K/UL
NON-GYN CYTOLOGY REPORT: NORMAL
PLATELET # BLD AUTO: 223 K/UL (ref 140–440)
PMV BLD AUTO: 11.2 FL (ref 7.5–11.1)
POTASSIUM SERPL-SCNC: 3.8 MMOL/L (ref 3.5–5.1)
POTASSIUM SERPL-SCNC: 3.8 MMOL/L (ref 3.5–5.1)
POTASSIUM SERPL-SCNC: 4 MMOL/L (ref 3.5–5.1)
PROT SERPL-MCNC: 6.4 G/DL (ref 6.4–8.2)
RBC # BLD AUTO: 5.64 M/UL (ref 4.2–5.4)
SODIUM SERPL-SCNC: 134 MMOL/L (ref 136–145)
SODIUM SERPL-SCNC: 137 MMOL/L (ref 136–145)
SODIUM SERPL-SCNC: 137 MMOL/L (ref 136–145)
WBC OTHER # BLD: 10.7 K/UL (ref 4–10.5)

## 2025-01-16 PROCEDURE — 6370000000 HC RX 637 (ALT 250 FOR IP)

## 2025-01-16 PROCEDURE — 2500000003 HC RX 250 WO HCPCS: Performed by: INTERNAL MEDICINE

## 2025-01-16 PROCEDURE — 2060000000 HC ICU INTERMEDIATE R&B

## 2025-01-16 PROCEDURE — 99232 SBSQ HOSP IP/OBS MODERATE 35: CPT | Performed by: INTERNAL MEDICINE

## 2025-01-16 PROCEDURE — 71045 X-RAY EXAM CHEST 1 VIEW: CPT

## 2025-01-16 PROCEDURE — 36415 COLL VENOUS BLD VENIPUNCTURE: CPT

## 2025-01-16 PROCEDURE — 6360000002 HC RX W HCPCS: Performed by: INTERNAL MEDICINE

## 2025-01-16 PROCEDURE — 6370000000 HC RX 637 (ALT 250 FOR IP): Performed by: STUDENT IN AN ORGANIZED HEALTH CARE EDUCATION/TRAINING PROGRAM

## 2025-01-16 PROCEDURE — 85025 COMPLETE CBC W/AUTO DIFF WBC: CPT

## 2025-01-16 PROCEDURE — 94761 N-INVAS EAR/PLS OXIMETRY MLT: CPT

## 2025-01-16 PROCEDURE — 2700000000 HC OXYGEN THERAPY PER DAY

## 2025-01-16 PROCEDURE — 83735 ASSAY OF MAGNESIUM: CPT

## 2025-01-16 PROCEDURE — 80048 BASIC METABOLIC PNL TOTAL CA: CPT

## 2025-01-16 PROCEDURE — 6360000002 HC RX W HCPCS: Performed by: STUDENT IN AN ORGANIZED HEALTH CARE EDUCATION/TRAINING PROGRAM

## 2025-01-16 PROCEDURE — 6370000000 HC RX 637 (ALT 250 FOR IP): Performed by: INTERNAL MEDICINE

## 2025-01-16 PROCEDURE — 80053 COMPREHEN METABOLIC PANEL: CPT

## 2025-01-16 PROCEDURE — APPNB15 APP NON BILLABLE TIME 0-15 MINS

## 2025-01-16 PROCEDURE — 6360000002 HC RX W HCPCS

## 2025-01-16 RX ORDER — BUMETANIDE 0.25 MG/ML
1 INJECTION, SOLUTION INTRAMUSCULAR; INTRAVENOUS 2 TIMES DAILY
Status: DISCONTINUED | OUTPATIENT
Start: 2025-01-16 | End: 2025-01-17

## 2025-01-16 RX ORDER — FUROSEMIDE 40 MG/1
40 TABLET ORAL DAILY
Status: DISCONTINUED | OUTPATIENT
Start: 2025-01-17 | End: 2025-01-18 | Stop reason: HOSPADM

## 2025-01-16 RX ORDER — MAGNESIUM SULFATE IN WATER 40 MG/ML
2000 INJECTION, SOLUTION INTRAVENOUS ONCE
Status: COMPLETED | OUTPATIENT
Start: 2025-01-16 | End: 2025-01-16

## 2025-01-16 RX ADMIN — ENOXAPARIN SODIUM 40 MG: 100 INJECTION SUBCUTANEOUS at 08:03

## 2025-01-16 RX ADMIN — SODIUM CHLORIDE, PRESERVATIVE FREE 10 ML: 5 INJECTION INTRAVENOUS at 21:04

## 2025-01-16 RX ADMIN — SODIUM CHLORIDE, PRESERVATIVE FREE 10 ML: 5 INJECTION INTRAVENOUS at 08:02

## 2025-01-16 RX ADMIN — QUETIAPINE FUMARATE 50 MG: 25 TABLET ORAL at 21:04

## 2025-01-16 RX ADMIN — ESCITALOPRAM OXALATE 10 MG: 10 TABLET ORAL at 08:02

## 2025-01-16 RX ADMIN — EMPAGLIFLOZIN 10 MG: 10 TABLET, FILM COATED ORAL at 08:02

## 2025-01-16 RX ADMIN — ALPRAZOLAM 0.5 MG: 0.5 TABLET ORAL at 21:04

## 2025-01-16 RX ADMIN — MAGNESIUM SULFATE HEPTAHYDRATE 2000 MG: 40 INJECTION, SOLUTION INTRAVENOUS at 11:24

## 2025-01-16 RX ADMIN — PRAMIPEXOLE DIHYDROCHLORIDE 0.12 MG: 0.25 TABLET ORAL at 21:04

## 2025-01-16 RX ADMIN — CARVEDILOL 6.25 MG: 6.25 TABLET, FILM COATED ORAL at 08:02

## 2025-01-16 RX ADMIN — BUMETANIDE 1 MG: 0.25 INJECTION INTRAMUSCULAR; INTRAVENOUS at 08:02

## 2025-01-16 NOTE — PROGRESS NOTES
No discharge.      Conjunctiva/sclera: Conjunctivae normal.   Cardiovascular:      Rate and Rhythm: Normal rate and regular rhythm.      Pulses: Normal pulses.      Heart sounds: Normal heart sounds. No murmur heard.     No friction rub. No gallop.   Pulmonary:      Effort: Pulmonary effort is normal. Tachypnea present. No respiratory distress.      Breath sounds: Decreased air movement and transmitted upper airway sounds present. No stridor. Rales present. No decreased breath sounds, wheezing or rhonchi.   Abdominal:      General: Bowel sounds are normal. There is no distension.      Palpations: Abdomen is soft. There is no mass.      Tenderness: There is no abdominal tenderness. There is no guarding or rebound.      Hernia: No hernia is present.   Musculoskeletal:      Right lower le+ Pitting Edema present.      Left lower le+ Pitting Edema present.   Skin:     General: Skin is warm.      Capillary Refill: Capillary refill takes less than 2 seconds.   Neurological:      Mental Status: She is alert and oriented to person, place, and time.   Psychiatric:         Mood and Affect: Mood normal.          Current Medications      empagliflozin  10 mg Oral Daily    bumetanide  1 mg IntraVENous BID    carvedilol  6.25 mg Oral BID WC    sodium chloride flush  5-40 mL IntraVENous 2 times per day    enoxaparin  40 mg SubCUTAneous Daily    escitalopram  10 mg Oral Daily    pramipexole  0.125 mg Oral Nightly    QUEtiapine  50 mg Oral Nightly         Labs and Imaging Studies   Laboratory findings:  Echo (TTE) complete (PRN contrast/bubble/strain/3D)    Result Date: 2025    Left Ventricle: Normal left ventricular systolic function with a visually estimated EF of 55 - 60%. Left ventricle size is normal. Normal wall thickness. Septal flattening in diastole and systole consistent with right ventricular volume and pressure overload.   Right Atrium: Right atrium is severely dilated.   Right Ventricle: Right ventricle is  Sodium 137 136 - 145 mmol/L    Potassium 3.8 3.5 - 5.1 mmol/L    Chloride 90 (L) 99 - 110 mmol/L    CO2 40 (H) 21 - 32 mmol/L    Anion Gap 7 (L) 9 - 17 mmol/L    Glucose 103 (H) 74 - 99 mg/dL    BUN 13 7 - 20 mg/dL    Creatinine 1.0 0.6 - 1.2 mg/dL    Est, Glom Filt Rate 58 (L) >60 mL/min/1.73m2    Calcium 9.3 8.3 - 10.6 mg/dL    Total Protein 6.4 6.4 - 8.2 g/dL    Albumin 3.4 3.4 - 5.0 g/dL    Albumin/Globulin Ratio 1.1 1.1 - 2.2    Total Bilirubin 1.4 (H) 0.0 - 1.0 mg/dL    Alkaline Phosphatase 77 40 - 129 U/L    ALT 5 (L) 10 - 40 U/L    AST 15 15 - 37 U/L   Magnesium    Collection Time: 01/16/25  8:21 AM   Result Value Ref Range    Magnesium 1.6 (L) 1.8 - 2.4 mg/dL       Results       Procedure Component Value Units Date/Time    Culture, Body Fluid (with Gram Stain) [[id number]] Collected: 01/14/25 0845    Order Status: Completed Specimen: Body Fluid Updated: 01/16/25 0654     Specimen Description .BODY FLUID     Special Requests Site: Body Fluid     Direct Exam MANY NEUTROPHILS      MODERATE RBC'S      NO ORGANISMS SEEN      Gram stain made from cytocentrifuged specimen.  Organisms and cells will be concentrated.     Culture No growth to date No growth 36 to 48 hours                Electronically signed by Clement Ryan MD on 1/16/2025 at 10:46 AM

## 2025-01-16 NOTE — CARE COORDINATION
CM reviewed chart and discussed in IDR. Pt is, currently, not medically stable for discharge. Possible stress test tomorrow.

## 2025-01-16 NOTE — PLAN OF CARE
We will begin the stress test 1/17/25.  I spoke with the nurse and notified them that the patient is to be NPO starting at midnight.     Marie Ballard [facility]

## 2025-01-16 NOTE — PROGRESS NOTES
[facility] [place] [address]  Phone: [phone]    Fax [phone]                  Cassidy Blankenship MD, [facility]       Carlos Rodriguez MD, [facility]  Jennifer Villanueva MD, [facility]    MD Brittany Doan MD Tariq Rizvi, MD Bilal Alam, MD Dr. Waseem Sajjad MD Melissa Kellis, APRMARY Mays, APRMARY Kaur, APRMARY Alvarado, APRN  Florentino West PA-C    CARDIOLOGY  NOTE      Name:  Alondra Ho /Age/Sex: 1960  (64 y.o. female)   MRN & CSN:  [id number] & [id number] Admission Date/Time: 2025 10:34 AM   Location:  -A PCP: Grzegorz Quach MD       Hospital Day: 4    - Cardiology consult is for:  CHF      ASSESSMENT/ PLAN:  Acute on chronic right-sided heart failure  Severe tricuspid regurgitation  Elevated troponin  Hypokalemia  -Volume overload improving.  -  -7.4 L total.  -9 pounds  -Thoracentesis : -700 cc  -Echo showing preserved EF with right-sided volume overload.  Severely dilated RV and RA.  Severe TR. right heart failure  -Plan for stress test tomorrow.  N.p.o. after midnight  -Continue Coreg to 6.25 mg twice daily and Jardiance 10 mg daily  -Continue Bumex 1 mg twice daily.  Monitor potassium closely  COPD exacerbation: Baseline O2 requirement 2-3 L  Pulmonary fibrosis  -Likely cause of right-sided heart failure  -Per primary team            Subjective:  Alondra is a 64 y.o.year old   Patient continues to have shortness of breath however states it is improving.    Now off of Vapotherm.  Near baseline oxygen requirement at this point.    Agreeable to undergo stress test tomorrow        Objective: Temperature:  Current - Temp: 97.3 °F (36.3 °C); Max - Temp  Av.8 °F (36.6 °C)  Min: 97.3 °F (36.3 °C)  Max: 98.2 °F (36.8 °C)    Respiratory Rate : Resp  Av.5  Min: 19  Max: 27    Pulse Range: Pulse  Av.7  Min: 59  Max: 79    Blood Presuure Range:  Systolic

## 2025-01-16 NOTE — PROGRESS NOTES
Pulmonary and Critical Care  Progress Note      VITALS:  BP (!) 85/54   Pulse 65   Temp 97.3 °F (36.3 °C) (Oral)   Resp 25   Ht 1.575 m (5' 2\")   Wt 75.2 kg (165 lb 12.6 oz)   SpO2 96%   BMI 30.32 kg/m²     Subjective:   CHIEF COMPLAINT :SOB     HPI:                The patient is a 64 y.o. female is sitting in the bed. She is in mild resp distress    Objective:   PHYSICAL EXAM:    LUNGS:Basal coarse crackles  Abd-soft, BS+,NT  Ext- 1 + pedal edema  CVS-s1s2, no murmurs      DATA:    CBC:  Recent Labs     01/16/25  0818   WBC 10.7*   RBC 5.64*   HGB 15.7   HCT 50.6*      MCV 89.7   MCH 27.8   MCHC 31.0*   RDW 16.5*      BMP:  Recent Labs     01/15/25  1908 01/16/25  0818 01/16/25  0821    137 137   K 3.3* 3.8 3.8   CL 88* 90* 90*   CO2 36* 38* 40*   BUN 13 13 13   CREATININE 0.9 0.9 1.0   CALCIUM 8.9 9.3 9.3   GLUCOSE 133* 100* 103*      ABG:  No results for input(s): \"PH\", \"PO2ART\", \"JPD8OYK\", \"HCO3\", \"BEART\", \"O2SAT\" in the last 72 hours.  BNP  No results found for: \"BNP\"   D-Dimer:  No results found for: \"DDIMER\"   Radiology: None      Assessment/Plan     Patient Active Problem List    Diagnosis Date Noted    Restless legs syndrome 01/11/2018     Priority: Medium    Acute on chronic respiratory failure with hypoxia 01/13/2025    Chronic obstructive pulmonary disease (HCC) 07/10/2024    Hypoxemia 07/10/2024    ILD (interstitial lung disease) ([facility]) 07/10/2024    Acute respiratory failure with hypoxia 06/28/2024    Pure hypercholesterolemia 01/02/2019    Osteopenia of lumbar spine 01/02/2019    Marijuana use 01/02/2019    Bilateral hip pain 05/14/2018    Allergic rhinitis 11/17/2017    Ulnar neuritis 09/19/2017    Back muscle spasm 05/04/2017    Osteoarthritis 07/08/2016    Essential hypertension 07/08/2016    Major depressive disorder, recurrent episode (HCC) 05/24/2016     Overview Note:     Replacing Inactive Diagnoses      Tobacco abuse 05/24/2016     Acute on chronic Hypoxic Resp failure-

## 2025-01-17 ENCOUNTER — APPOINTMENT (OUTPATIENT)
Dept: NON INVASIVE DIAGNOSTICS | Age: 65
End: 2025-01-17
Payer: COMMERCIAL

## 2025-01-17 ENCOUNTER — APPOINTMENT (OUTPATIENT)
Dept: NUCLEAR MEDICINE | Age: 65
End: 2025-01-17
Payer: COMMERCIAL

## 2025-01-17 LAB
ANION GAP SERPL CALCULATED.3IONS-SCNC: 10 MMOL/L (ref 9–17)
ANION GAP SERPL CALCULATED.3IONS-SCNC: 12 MMOL/L (ref 9–17)
BASOPHILS # BLD: 0.03 K/UL
BASOPHILS NFR BLD: 0 % (ref 0–1)
BNP SERPL-MCNC: 1827 PG/ML (ref 0–125)
BUN SERPL-MCNC: 14 MG/DL (ref 7–20)
BUN SERPL-MCNC: 18 MG/DL (ref 7–20)
CALCIUM SERPL-MCNC: 9.3 MG/DL (ref 8.3–10.6)
CALCIUM SERPL-MCNC: 9.4 MG/DL (ref 8.3–10.6)
CHLORIDE SERPL-SCNC: 90 MMOL/L (ref 99–110)
CHLORIDE SERPL-SCNC: 91 MMOL/L (ref 99–110)
CO2 SERPL-SCNC: 33 MMOL/L (ref 21–32)
CO2 SERPL-SCNC: 34 MMOL/L (ref 21–32)
CREAT SERPL-MCNC: 0.8 MG/DL (ref 0.6–1.2)
CREAT SERPL-MCNC: 0.9 MG/DL (ref 0.6–1.2)
ECHO BSA: 1.86 M2
EOSINOPHIL # BLD: 0.12 K/UL
EOSINOPHILS RELATIVE PERCENT: 2 % (ref 0–3)
ERYTHROCYTE [DISTWIDTH] IN BLOOD BY AUTOMATED COUNT: 16.2 % (ref 11.7–14.9)
GFR, ESTIMATED: 63 ML/MIN/1.73M2
GFR, ESTIMATED: 70 ML/MIN/1.73M2
GLUCOSE SERPL-MCNC: 110 MG/DL (ref 74–99)
GLUCOSE SERPL-MCNC: 95 MG/DL (ref 74–99)
HCT VFR BLD AUTO: 48.2 % (ref 37–47)
HGB BLD-MCNC: 15 G/DL (ref 12.5–16)
IMM GRANULOCYTES # BLD AUTO: 0.03 K/UL
IMM GRANULOCYTES NFR BLD: 0 %
LYMPHOCYTES NFR BLD: 1.02 K/UL
LYMPHOCYTES RELATIVE PERCENT: 12 % (ref 24–44)
MAGNESIUM SERPL-MCNC: 2 MG/DL (ref 1.8–2.4)
MAGNESIUM SERPL-MCNC: 2 MG/DL (ref 1.8–2.4)
MCH RBC QN AUTO: 28 PG (ref 27–31)
MCHC RBC AUTO-ENTMCNC: 31.1 G/DL (ref 32–36)
MCV RBC AUTO: 90.1 FL (ref 78–100)
MICROORGANISM SPEC CULT: NORMAL
MICROORGANISM/AGENT SPEC: NORMAL
MONOCYTES NFR BLD: 0.69 K/UL
MONOCYTES NFR BLD: 8 % (ref 0–4)
NEUTROPHILS NFR BLD: 77 % (ref 36–66)
NEUTS SEG NFR BLD: 6.33 K/UL
NUC STRESS EJECTION FRACTION: 76 %
PLATELET # BLD AUTO: 196 K/UL (ref 140–440)
PMV BLD AUTO: 11.2 FL (ref 7.5–11.1)
POTASSIUM SERPL-SCNC: 3.6 MMOL/L (ref 3.5–5.1)
POTASSIUM SERPL-SCNC: 3.8 MMOL/L (ref 3.5–5.1)
RBC # BLD AUTO: 5.35 M/UL (ref 4.2–5.4)
SERVICE CMNT-IMP: NORMAL
SODIUM SERPL-SCNC: 135 MMOL/L (ref 136–145)
SODIUM SERPL-SCNC: 135 MMOL/L (ref 136–145)
SPECIMEN DESCRIPTION: NORMAL
STRESS BASELINE DIAS BP: 72 MMHG
STRESS BASELINE HR: 67 BPM
STRESS BASELINE SYS BP: 114 MMHG
STRESS ESTIMATED WORKLOAD: 1 METS
STRESS PEAK DIAS BP: 73 MMHG
STRESS PEAK SYS BP: 122 MMHG
STRESS PERCENT HR ACHIEVED: 47 %
STRESS POST PEAK HR: 74 BPM
STRESS RATE PRESSURE PRODUCT: 9028 BPM*MMHG
STRESS TARGET HR: 157 BPM
TID: 1.05
WBC OTHER # BLD: 8.2 K/UL (ref 4–10.5)

## 2025-01-17 PROCEDURE — 85025 COMPLETE CBC W/AUTO DIFF WBC: CPT

## 2025-01-17 PROCEDURE — 93017 CV STRESS TEST TRACING ONLY: CPT

## 2025-01-17 PROCEDURE — 6370000000 HC RX 637 (ALT 250 FOR IP): Performed by: STUDENT IN AN ORGANIZED HEALTH CARE EDUCATION/TRAINING PROGRAM

## 2025-01-17 PROCEDURE — 6370000000 HC RX 637 (ALT 250 FOR IP): Performed by: INTERNAL MEDICINE

## 2025-01-17 PROCEDURE — 2500000003 HC RX 250 WO HCPCS: Performed by: INTERNAL MEDICINE

## 2025-01-17 PROCEDURE — A9500 TC99M SESTAMIBI: HCPCS | Performed by: INTERNAL MEDICINE

## 2025-01-17 PROCEDURE — 97535 SELF CARE MNGMENT TRAINING: CPT

## 2025-01-17 PROCEDURE — 99232 SBSQ HOSP IP/OBS MODERATE 35: CPT | Performed by: INTERNAL MEDICINE

## 2025-01-17 PROCEDURE — 80048 BASIC METABOLIC PNL TOTAL CA: CPT

## 2025-01-17 PROCEDURE — 6360000002 HC RX W HCPCS: Performed by: INTERNAL MEDICINE

## 2025-01-17 PROCEDURE — 3430000000 HC RX DIAGNOSTIC RADIOPHARMACEUTICAL: Performed by: INTERNAL MEDICINE

## 2025-01-17 PROCEDURE — 6370000000 HC RX 637 (ALT 250 FOR IP)

## 2025-01-17 PROCEDURE — 36415 COLL VENOUS BLD VENIPUNCTURE: CPT

## 2025-01-17 PROCEDURE — 93018 CV STRESS TEST I&R ONLY: CPT | Performed by: INTERNAL MEDICINE

## 2025-01-17 PROCEDURE — 78452 HT MUSCLE IMAGE SPECT MULT: CPT | Performed by: INTERNAL MEDICINE

## 2025-01-17 PROCEDURE — 83735 ASSAY OF MAGNESIUM: CPT

## 2025-01-17 PROCEDURE — 78452 HT MUSCLE IMAGE SPECT MULT: CPT

## 2025-01-17 PROCEDURE — 93016 CV STRESS TEST SUPVJ ONLY: CPT | Performed by: INTERNAL MEDICINE

## 2025-01-17 PROCEDURE — 94761 N-INVAS EAR/PLS OXIMETRY MLT: CPT

## 2025-01-17 PROCEDURE — 94664 DEMO&/EVAL PT USE INHALER: CPT

## 2025-01-17 PROCEDURE — 2060000000 HC ICU INTERMEDIATE R&B

## 2025-01-17 PROCEDURE — 2700000000 HC OXYGEN THERAPY PER DAY

## 2025-01-17 PROCEDURE — 97162 PT EVAL MOD COMPLEX 30 MIN: CPT

## 2025-01-17 PROCEDURE — 83880 ASSAY OF NATRIURETIC PEPTIDE: CPT

## 2025-01-17 PROCEDURE — 94150 VITAL CAPACITY TEST: CPT

## 2025-01-17 PROCEDURE — 97116 GAIT TRAINING THERAPY: CPT

## 2025-01-17 PROCEDURE — 97166 OT EVAL MOD COMPLEX 45 MIN: CPT

## 2025-01-17 RX ORDER — TETRAKIS(2-METHOXYISOBUTYLISOCYANIDE)COPPER(I) TETRAFLUOROBORATE 1 MG/ML
33 INJECTION, POWDER, LYOPHILIZED, FOR SOLUTION INTRAVENOUS
Status: COMPLETED | OUTPATIENT
Start: 2025-01-17 | End: 2025-01-17

## 2025-01-17 RX ORDER — TETRAKIS(2-METHOXYISOBUTYLISOCYANIDE)COPPER(I) TETRAFLUOROBORATE 1 MG/ML
10.7 INJECTION, POWDER, LYOPHILIZED, FOR SOLUTION INTRAVENOUS
Status: COMPLETED | OUTPATIENT
Start: 2025-01-17 | End: 2025-01-17

## 2025-01-17 RX ORDER — REGADENOSON 0.08 MG/ML
0.4 INJECTION, SOLUTION INTRAVENOUS
Status: COMPLETED | OUTPATIENT
Start: 2025-01-17 | End: 2025-01-17

## 2025-01-17 RX ORDER — ASPIRIN 81 MG/1
81 TABLET, CHEWABLE ORAL DAILY
Status: DISCONTINUED | OUTPATIENT
Start: 2025-01-17 | End: 2025-01-18 | Stop reason: HOSPADM

## 2025-01-17 RX ORDER — ATORVASTATIN CALCIUM 40 MG/1
40 TABLET, FILM COATED ORAL NIGHTLY
Status: DISCONTINUED | OUTPATIENT
Start: 2025-01-17 | End: 2025-01-18 | Stop reason: HOSPADM

## 2025-01-17 RX ORDER — MIDODRINE HYDROCHLORIDE 5 MG/1
5 TABLET ORAL
Status: DISCONTINUED | OUTPATIENT
Start: 2025-01-17 | End: 2025-01-18 | Stop reason: HOSPADM

## 2025-01-17 RX ADMIN — ASPIRIN 81 MG CHEWABLE TABLET 81 MG: 81 TABLET CHEWABLE at 15:56

## 2025-01-17 RX ADMIN — ESCITALOPRAM OXALATE 10 MG: 10 TABLET ORAL at 08:08

## 2025-01-17 RX ADMIN — MIDODRINE HYDROCHLORIDE 5 MG: 5 TABLET ORAL at 13:04

## 2025-01-17 RX ADMIN — QUETIAPINE FUMARATE 50 MG: 25 TABLET ORAL at 20:06

## 2025-01-17 RX ADMIN — ATORVASTATIN CALCIUM 40 MG: 40 TABLET, FILM COATED ORAL at 20:06

## 2025-01-17 RX ADMIN — CARVEDILOL 6.25 MG: 6.25 TABLET, FILM COATED ORAL at 15:56

## 2025-01-17 RX ADMIN — KIT FOR THE PREPARATION OF TECHNETIUM TC99M SESTAMIBI 10.7 MILLICURIE: 1 INJECTION, POWDER, LYOPHILIZED, FOR SOLUTION PARENTERAL at 12:15

## 2025-01-17 RX ADMIN — ENOXAPARIN SODIUM 40 MG: 100 INJECTION SUBCUTANEOUS at 08:08

## 2025-01-17 RX ADMIN — SODIUM CHLORIDE, PRESERVATIVE FREE 10 ML: 5 INJECTION INTRAVENOUS at 20:06

## 2025-01-17 RX ADMIN — ALPRAZOLAM 0.5 MG: 0.5 TABLET ORAL at 20:06

## 2025-01-17 RX ADMIN — REGADENOSON 0.4 MG: 0.08 INJECTION, SOLUTION INTRAVENOUS at 10:48

## 2025-01-17 RX ADMIN — EMPAGLIFLOZIN 10 MG: 10 TABLET, FILM COATED ORAL at 08:08

## 2025-01-17 RX ADMIN — MIDODRINE HYDROCHLORIDE 5 MG: 5 TABLET ORAL at 08:35

## 2025-01-17 RX ADMIN — KIT FOR THE PREPARATION OF TECHNETIUM TC99M SESTAMIBI 33 MILLICURIE: 1 INJECTION, POWDER, LYOPHILIZED, FOR SOLUTION PARENTERAL at 12:15

## 2025-01-17 RX ADMIN — PRAMIPEXOLE DIHYDROCHLORIDE 0.12 MG: 0.25 TABLET ORAL at 20:06

## 2025-01-17 NOTE — PROGRESS NOTES
Pulmonary and Critical Care  Progress Note      VITALS:  BP (!) 88/66   Pulse 69   Temp 97.4 °F (36.3 °C) (Oral)   Resp 18   Ht 1.575 m (5' 2\")   Wt 75.2 kg (165 lb 12.6 oz)   SpO2 96%   BMI 30.32 kg/m²     Subjective:   CHIEF COMPLAINT :SOB     HPI:                The patient is a 64 y.o. female is lying in the bed. She is in mild resp distress    Objective:   PHYSICAL EXAM:    LUNGS:Basal coarse crackles  Abd-soft, BS+,NT  Ext- 1 + pedal edema  CVS-s1s2, no murmurs      DATA:    CBC:  Recent Labs     01/16/25  0818 01/17/25  0443   WBC 10.7* 8.2   RBC 5.64* 5.35   HGB 15.7 15.0   HCT 50.6* 48.2*    196   MCV 89.7 90.1   MCH 27.8 28.0   MCHC 31.0* 31.1*   RDW 16.5* 16.2*      BMP:  Recent Labs     01/16/25 0821 01/16/25 2059 01/17/25  0443    134* 135*   K 3.8 4.0 3.6   CL 90* 89* 90*   CO2 40* 36* 33*   BUN 13 14 14   CREATININE 1.0 0.9 0.8   CALCIUM 9.3 9.3 9.4   GLUCOSE 103* 103* 95      ABG:  No results for input(s): \"PH\", \"PO2ART\", \"QRX0EGX\", \"HCO3\", \"BEART\", \"O2SAT\" in the last 72 hours.  BNP  No results found for: \"BNP\"   D-Dimer:  No results found for: \"DDIMER\"   Radiology: None      Assessment/Plan     Patient Active Problem List    Diagnosis Date Noted    Restless legs syndrome 01/11/2018     Priority: Medium    Acute on chronic respiratory failure with hypoxia 01/13/2025    Chronic obstructive pulmonary disease (HCC) 07/10/2024    Hypoxemia 07/10/2024    ILD (interstitial lung disease) ([facility]) 07/10/2024    Acute respiratory failure with hypoxia 06/28/2024    Pure hypercholesterolemia 01/02/2019    Osteopenia of lumbar spine 01/02/2019    Marijuana use 01/02/2019    Bilateral hip pain 05/14/2018    Allergic rhinitis 11/17/2017    Ulnar neuritis 09/19/2017    Back muscle spasm 05/04/2017    Osteoarthritis 07/08/2016    Essential hypertension 07/08/2016    Major depressive disorder, recurrent episode (HCC) 05/24/2016     Overview Note:     Replacing Inactive Diagnoses      Tobacco abuse  05/24/2016     Acute on chronic Hypoxic Resp failure- improving  Chronic Metabolic Alkalosis  CPFE  Severe Pulmonary HTN sec to CPFE  Large Right Pleural effusion s/p thoracentesis  HTN  HLD  Obesity  ? HANNAH     Await stress test today  Inhalers  Diuresis  ICS  OOB  Keep sats > 92%  BIPAP prn  DVT and GI Prophylaxis  Await placement  C.w present management    Electronically signed by Ta Jennings MD on 1/17/2025 at 11:32 AM

## 2025-01-17 NOTE — CONSULTS
[facility] ACUTE CARE PHYSICAL THERAPY EVALUATION  Alondra Ho, 1960, 2030/2030-A, 1/17/2025    History  [place]:  The primary encounter diagnosis was Acute respiratory failure with hypoxia. Diagnoses of Acute congestive heart failure, unspecified heart failure type (HCC), Pleural effusion, Acute on chronic congestive heart failure, unspecified heart failure type (HCC), and Shortness of breath were also pertinent to this visit.  Patient  has a past medical history of Allergic rhinitis, Anxiety, Arthritis, Chronic back pain, Chronic obstructive pulmonary disease (HCC), Depression, Essential hypertension, Fall, Hyperlipidemia, Osteoarthritis, Restless legs syndrome, Sciatic pain, and Tobacco abuse.  Patient  has a past surgical history that includes Eye surgery (Left); fracture surgery (bilat wrist fx as a child); Tubal ligation (1985); and Patella surgery (Left, 12/17/15).    Discharge Recommendation: Facility for intensive rehabilitation, anticipate 3 hours per day and 5 days per week.     Equipment: If pt discharges home, rec RW    Subjective:    Patient states:  \"I'm just a little off balance.\"      Pain:  denies pain.      Communication with other providers:  Handoff to RN, OT    Restrictions: general precautions, fall risk    Home Setup/Prior level of function  Social/Functional History  Lives With: Family  Type of Home: House  Home Layout: One level  Home Access: Stairs to enter without rails  Entrance Stairs - Number of Steps: 1  Bathroom Shower/Tub: Tub/Shower unit, Shower chair with back  Bathroom Toilet: Standard  Bathroom Equipment: Shower chair  Bathroom Accessibility: Accessible  Home Equipment: None  Receives Help From: Family  Prior Level of Assist for ADLs: Independent  Homemaking Responsibilities: No  Prior Level of Assist for Transfers: Independent  Active : Yes (car broke down. family drives)  Occupation: Unemployed    Examination of body systems (includes body      General Plan: 3-5 times per week         Goals:  Short Term Goals  Time Frame for Short Term Goals: 1 week  Short Term Goal 1: pt to complete all bed mobility mod I  Short Term Goal 2: pt to complete all STS transfers to/from bed, commode, and chair mod I  Short Term Goal 3: pt to ambulate 100' with LRAD mod I  Short Term Goal 4: pt to       Treatment plan:  Bed mobility, transfers, balance, gait, TA, TX    Recommendations for NURSING mobility: amb with gait belt    Time:   Time in: 0914  Time out: 0933  Timed treatment minutes: 9  Total time: 19    Electronically signed by:    Renetta Yepez, PT  1/17/2025, 11:59 AM

## 2025-01-17 NOTE — CARE COORDINATION
CM reviewed chart and discussed in IDR. Pt to have a stress test. Order placed for OT/PT.  CM noted that OT/PT recommended intensive therapy, 3 hours a day, 5 days a week.  CM spoke with pt about the recommendations. Pt stated that she does not want to go to therapy. Pt wants to return to her home and family.

## 2025-01-17 NOTE — PROGRESS NOTES
In-Patient Progress Note    Patient:  Alondra Ho 64 y.o. female MRN: [id number]     Date of Service: 1/17/2025    Hospital Day: 5      Chief complaint: had concerns including Shortness of Breath.      Subjective   Patient seen and examined in the morning. Patient states she feels a little better. SOB is a little better. Has made 4800 mL urine output in the last 24 hours.       See ROS    I have reviewed all pertinent PMHx, PSHx, FamHx, SocialHx, medications, and allergies and updated history as appropriate. I have reviewed images as appropriate.     Assessment and Plan   Alondra Ho, a 64 y.o. female, with a history of HTN, HLD, mood disorder, class I obesity, chronic resp failure with hypoxia on 2-3L/min at baseline, COPD with pulm fibrosis, Leg swelling  was admitted on 1/13/2025 with complaints of had concerns including Shortness of Breath.      Assessment  Acute On Chronic Resp Failure with Hypoxia   Baseline 2-3L/min   Placed on vapotherm in the ER for SpO2 down to 50%~  SpO2 drop worse with exertion   PE ruled out   Oxygen requirement down to 4 L from Vapotherm   PT/OT recommended ARU   Pulm on board     Acute Heart failure vs Acute on Chronic Heart failure   Was on lasix till 1/10/2025 per patient but stopped taking   CT suggestive of pulm edema   BNP 6895  Had leg swelling - now improved post lasix in the ER   On IV Bumex BID today. BMP, Mg Q12H. Will give IV magnesium today as well.   Discussed with cards- Plan for stress test today  Stress test showed mild ischemia - patient refused [facility] for now. Wants to do as OP     Lt. Pleural Effusion s/p Thoracentesis   Hypokalemia, Hypomagnesemia  a. K 3.5, Mg 1.7    Elevated Troponin 2/2 likely Demand Ischemia 2/2 Resp failure and heart failure   Mild Troponin elevation 2/2 possibly heart failure vs Ischemia   Pulm HTN   H/O COPD with Pulm Fibrosis and Pulm HTN   H/O HTN, HLD   H/O Mood Disorder   Class I obesity         Plan  Breathing treatment   Resume    Magnesium    Collection Time: 01/17/25  4:43 AM   Result Value Ref Range    Magnesium 2.0 1.8 - 2.4 mg/dL   Nuclear stress test with myocardial perfusion    Collection Time: 01/17/25 12:14 PM   Result Value Ref Range    Baseline Systolic  mmHg    Baseline Diastolic BP 72 mmHg    Stress Systolic  mmHg    Stress Diastolic BP 73 mmHg    Baseline HR 67 BPM    Stress Peak HR 74 BPM    Stress Estimated Workload 1.0 METS    Body Surface Area 1.86 m2    Stress Rate Pressure Product 9,028 BPM*mmHg    Stress Target  bpm    Stress Percent HR Achieved 47 %    TID 1.05     Nuc Stress EF 76 %       Results       Procedure Component Value Units Date/Time    Culture, Body Fluid (with Gram Stain) [[id number]] Collected: 01/14/25 0845    Order Status: Completed Specimen: Body Fluid Updated: 01/17/25 1037     Specimen Description .BODY FLUID     Special Requests Site: Body Fluid     Direct Exam MANY NEUTROPHILS      MODERATE RBC'S      NO ORGANISMS SEEN      Gram stain made from cytocentrifuged specimen.  Organisms and cells will be concentrated.     Culture No growth 60 to 72 hours                Electronically signed by Clement Ryan MD on 1/17/2025 at 2:46 PM

## 2025-01-17 NOTE — PROGRESS NOTES
CARDIOLOGY  NOTE        Name:  Alondra Ho /Age/Sex: 1960  (64 y.o. female)   MRN & CSN:  [id number] & [id number] Admission Date/Time: 2025 10:34 AM   Location:  -A PCP: Grzegorz Quach MD       Hospital Day: 5        PLAN FROM CARDIOLOGY FOR TODAY:   Cardiolite done today which showed mild apical ischemia  I discussed with patient regarding further workup  However the patient wants to hold off and says her lungs have issues first  We will see her in the office for further workup      - cardiology consult is for: Abnormal troponin    -  Interval history: Cardiolite done today showed a possibility of mild apical ischemia    ASSESSMENT/ PLAN:      Patient needs to rule out ischemic heart disease Cardiolite when his pulmonary status is better  Stress test done today for apical ischemia  Will treat medically for now patient is on beta-blocker will add aspirin to her drug regimen and statin        Patient has massively dilated right-sided chambers pulmonary has been consulted PE has been ruled out  Suggest pulmonary consultation for right-sided issues     Echo showed preserved EF dilated right-sided chambers  Increase Coreg and add Jardiance patient is on Bumex as well     Congestive heart failure patient's BNP is markedly elevated will obtain an echocardiogram today for further assessment     COPD exacerbation continue with bronchodilators     Has EKG changes in the inferior lateral leads, troponin mildly elevated will probably need ischemia workup as well             Subjective:      Todays complain: Patient breathing better    HPI:  Alondra is a 64 y.o.year old who and presents with had concerns including Shortness of Breath.  Chief Complaint   Patient presents with    Shortness of Breath           Objective: Temperature:  Current - Temp: 97.2 °F (36.2 °C); Max - Temp  Av.6 °F (36.4 °C)  Min: 97.2 °F (36.2 °C)  Max: 97.9 °F (36.6

## 2025-01-17 NOTE — PROGRESS NOTES
Occupational Therapy  [facility] ACUTE CARE OCCUPATIONAL THERAPY EVALUATION    Alondra Ho, 1960, 2030/2030-A, 1/17/2025    Discharge Recommendation: Facility for intensive rehabilitation, anticipate 3 hours per day and 5 days per week.      History:  [place]:  The primary encounter diagnosis was Acute respiratory failure with hypoxia. Diagnoses of Acute congestive heart failure, unspecified heart failure type (HCC), Pleural effusion, Acute on chronic congestive heart failure, unspecified heart failure type (HCC), and Shortness of breath were also pertinent to this visit.  Past Medical History:   Diagnosis Date    Allergic rhinitis     flowers    Anxiety     Arthritis     left hip    Chronic back pain     Chronic obstructive pulmonary disease (HCC) 7/10/2024    Depression     Essential hypertension 7/8/2016    Fall     \"fell coming in the door at home on 12/11/2015- went to ER- put brace on and sent me home then 12/13/2015 fell again- rehurt my left knee- for surgery\"( 12/17/2015\")    Hyperlipidemia 10/28/2016    Osteoarthritis     Restless legs syndrome     Sciatic pain     Tobacco abuse          Subjective:  Patient states: \"This is my first time out of bed\"  Pain:  denies  Communication with other providers: co-catracho w/ PT, handoff to RN  Restrictions: General Precautions, Fall Risk    Home Setup/Prior level of function:  Social/Functional History  Lives With: Family  Type of Home: House  Home Layout: One level  Home Access: Stairs to enter without rails  Entrance Stairs - Number of Steps: 1  Bathroom Shower/Tub: Tub/Shower unit, Shower chair with back  Bathroom Toilet: Standard  Bathroom Equipment: Shower chair  Bathroom Accessibility: Accessible  Home Equipment: None  Receives Help From: Family  Prior Level of Assist for ADLs: Independent  Homemaking Responsibilities: No  Prior Level of Assist for Transfers: Independent  Active : Yes (car broke down. family drives)  Occupation: Unemployed  transfers to and from bed, chair, toilet, shower chair w/ mod I.  Pt will ambulate functional household distance w/ mod I.  Pt will complete all aspects of toileting task w/ mod I.  Pt will perform therex/theract in order to increase strength and functional activity tolerance necessary for increased independence w/ ADL routine.    Pt goal: go home, get stronger  Time Frame for STGs: discharge    Equipment: Continue to assess at next LOC    Time:   Time in: 0914  Time out: 0933  Total time: 19  Timed treatment minutes: 9        Electronically signed by:      RASHAWN Huizar/L  [id number]

## 2025-01-18 VITALS
DIASTOLIC BLOOD PRESSURE: 71 MMHG | OXYGEN SATURATION: 93 % | HEART RATE: 71 BPM | TEMPERATURE: 98.2 F | BODY MASS INDEX: 30.51 KG/M2 | HEIGHT: 62 IN | SYSTOLIC BLOOD PRESSURE: 97 MMHG | WEIGHT: 165.79 LBS | RESPIRATION RATE: 22 BRPM

## 2025-01-18 PROCEDURE — 6370000000 HC RX 637 (ALT 250 FOR IP)

## 2025-01-18 PROCEDURE — 6370000000 HC RX 637 (ALT 250 FOR IP): Performed by: INTERNAL MEDICINE

## 2025-01-18 PROCEDURE — 97535 SELF CARE MNGMENT TRAINING: CPT

## 2025-01-18 PROCEDURE — 2700000000 HC OXYGEN THERAPY PER DAY

## 2025-01-18 PROCEDURE — 2500000003 HC RX 250 WO HCPCS: Performed by: INTERNAL MEDICINE

## 2025-01-18 PROCEDURE — 97530 THERAPEUTIC ACTIVITIES: CPT

## 2025-01-18 PROCEDURE — 94761 N-INVAS EAR/PLS OXIMETRY MLT: CPT

## 2025-01-18 PROCEDURE — 6370000000 HC RX 637 (ALT 250 FOR IP): Performed by: STUDENT IN AN ORGANIZED HEALTH CARE EDUCATION/TRAINING PROGRAM

## 2025-01-18 PROCEDURE — 99232 SBSQ HOSP IP/OBS MODERATE 35: CPT | Performed by: INTERNAL MEDICINE

## 2025-01-18 PROCEDURE — 6360000002 HC RX W HCPCS: Performed by: INTERNAL MEDICINE

## 2025-01-18 RX ORDER — CARVEDILOL 6.25 MG/1
3.12 TABLET ORAL 2 TIMES DAILY WITH MEALS
Qty: 60 TABLET | Refills: 3 | Status: SHIPPED | OUTPATIENT
Start: 2025-01-18

## 2025-01-18 RX ORDER — ALPRAZOLAM 0.5 MG
0.5 TABLET ORAL 2 TIMES DAILY PRN
Qty: 14 TABLET | Refills: 0 | Status: SHIPPED | OUTPATIENT
Start: 2025-01-18 | End: 2025-01-25

## 2025-01-18 RX ORDER — MIDODRINE HYDROCHLORIDE 5 MG/1
5 TABLET ORAL
Qty: 90 TABLET | Refills: 3 | Status: SHIPPED | OUTPATIENT
Start: 2025-01-18

## 2025-01-18 RX ORDER — POTASSIUM BICARBONATE 391 MG/1
1 TABLET, EFFERVESCENT ORAL DAILY
Qty: 30 TABLET | Refills: 0 | Status: SHIPPED | OUTPATIENT
Start: 2025-01-18 | End: 2025-02-17

## 2025-01-18 RX ORDER — FUROSEMIDE 40 MG/1
40 TABLET ORAL DAILY
Qty: 60 TABLET | Refills: 3 | Status: SHIPPED | OUTPATIENT
Start: 2025-01-19

## 2025-01-18 RX ORDER — QUETIAPINE FUMARATE 50 MG/1
50 TABLET, FILM COATED ORAL NIGHTLY
Qty: 60 TABLET | Refills: 3 | Status: SHIPPED | OUTPATIENT
Start: 2025-01-18

## 2025-01-18 RX ORDER — ASPIRIN 81 MG/1
81 TABLET, CHEWABLE ORAL DAILY
Qty: 30 TABLET | Refills: 3 | Status: SHIPPED | OUTPATIENT
Start: 2025-01-19

## 2025-01-18 RX ADMIN — FUROSEMIDE 40 MG: 40 TABLET ORAL at 09:34

## 2025-01-18 RX ADMIN — SODIUM CHLORIDE, PRESERVATIVE FREE 10 ML: 5 INJECTION INTRAVENOUS at 09:38

## 2025-01-18 RX ADMIN — CARVEDILOL 6.25 MG: 6.25 TABLET, FILM COATED ORAL at 09:34

## 2025-01-18 RX ADMIN — ENOXAPARIN SODIUM 40 MG: 100 INJECTION SUBCUTANEOUS at 09:35

## 2025-01-18 RX ADMIN — ESCITALOPRAM OXALATE 10 MG: 10 TABLET ORAL at 09:35

## 2025-01-18 RX ADMIN — ASPIRIN 81 MG CHEWABLE TABLET 81 MG: 81 TABLET CHEWABLE at 09:34

## 2025-01-18 RX ADMIN — MIDODRINE HYDROCHLORIDE 5 MG: 5 TABLET ORAL at 09:34

## 2025-01-18 RX ADMIN — EMPAGLIFLOZIN 10 MG: 10 TABLET, FILM COATED ORAL at 09:35

## 2025-01-18 NOTE — PROGRESS NOTES
Occupational Therapy    Occupational Therapy Treatment Note    Name: Alondra Ho MRN: [id number] :   1960   Date:  2025   Admission Date: 2025 Room:  -A     Restrictions/Precautions:          fall risk, general precautions      Subjective:  Patient states:  \"I told them I'm ready to go home\"  Pain:   denies    Objective:    Observation: supine in bed, agreeable   Objective Measures:  Presented on 4L O2. SpO2 89-91% during ambulation, dropped to 86% with seated RB after ambulating. Recovered to >90% within 30 seconds of seated RB + PLB.     Treatment, including education:  Self Care Training:   Cues were given for safety, sequence, UE/LE placement, visual cues, and balance.    Activities performed today included UB/LB dressing tasks     SBA to don socks while seated EOB.  SBA to don/doff depends in sitting/standing. SBA dae care in standing.  Min A to don/doff gown while seated EOB.    Therapeutic Activity Training:   Therapeutic activity training was instructed today.  Cues were given for safety, sequence, UE/LE placement, awareness, and balance.    Activities performed today included bed mobility training, sup-sit, sit-stand, ambulation.    Supine to sitting EOB w/ SBA, Vcs for initiation.   STS to/from EOB (3x) during ADL tasks w/ SBA.   Ambulated functional household distance w/o AD w/ CGA, Vcs for pathway and posture. 1 LOB when turning to R requiring min A to correct.   Stand to sit to recliner w/ SBA, Vcs for alignment.     Left seated in recliner w/ all needs met, alarm on.     Assessment / Impression:    Patient's tolerance of treatment: Well  Adverse Reaction: None  Significant change in status and impact: Improved from initial evaluation  Barriers to improvement: None noted      Plan for Next Session:    Continue w/ OT POC and functional goals, address safety/independence w/ ADLs and transfers/mobility.     Time in:  08  Time out:  0900  Timed treatment minutes:  27  Total  treatment time:  25      Electronically signed by:      Tara Neri, OT

## 2025-01-18 NOTE — PROGRESS NOTES
Went over discharge instructions as well as medications with pt. All questions answered. Pt verbalized understanding. Belongings with pt. Pt family bedside. Pt instructed to put call light on when ready to leave.

## 2025-01-18 NOTE — CARE COORDINATION
Cm in to see pt at bedside, introduced self and role of case management. Pt stated that she would like HC, pt has a discharge order in. Cm provided pt with list of HC agencies in network with pt's insurance. Pt chose [facility]. No other questions or needs stated at this time.     Cm faxed referral to [facility] ([phone]). Cm to follow.

## 2025-01-18 NOTE — DISCHARGE SUMMARY
V2.0  Discharge Summary    Name:  Alondra Ho /Age/Sex: 1960 (64 y.o. female)   Admit Date: 2025  Discharge Date: 25    MRN & CSN:  [id number] & [id number] Encounter Date and Time 25 11:48 AM EST    Attending:  Clement Wen,* Discharging Provider: Clement Ryan MD       Hospital Course:     Brief HPI: Alondra Ho is a 64 y.o. female who presented with  H/O HTN, HLD, mood disorder, class I obesity, chronic resp failure with hypoxia on 2-3L/min at baseline, COPD with pulm fibrosis, Leg swelling,  who presented on 2025 with complaints of SOB and ADEN.      According to the patient, she has a chronic cough and ADEN. She states she has not been feeling too well for the past few weeks and had been taking lasix till Thursday for the past 2 months due to leg swelling. States she had gone to her physician in the AM on the day of presentation and was told that she was blue. She was sent to the ER. She states she couldn't even put shoes on due to leg swelling.     Additionally, she denies any worsening of chronic cough or wheezing.     Brief Problem Based Course:     Patient doing well. Reports her breathing is better. Stress test showed mild ischemia - patient refused LHC for now. Wants to do as OP. PT/OT recommended ARU but wants to go home. HHC ordered. ON Discharge saturating well on 3 L. Recommended to follow up with Cardiology     Assessment    Acute On Chronic Resp Failure with Hypoxia   Baseline 2-3L/min   Placed on vapotherm in the ER for SpO2 down to 50%~  SpO2 drop worse with exertion   PE ruled out   Oxygen requirement down to 3 L from Vapotherm   PT/OT recommended ARU   Pulm on board     Acute Heart failure vs Acute on Chronic Heart failure   Was on lasix till 1/10/2025 per patient but stopped taking   CT suggestive of pulm edema   BNP 6895  Had leg swelling - now improved post lasix in the ER   S/p IV Bumex BID .   Discussed with cards- Plan for stress test  supple  Cardiovascular: Regular rate.  Respiratory: Clear to auscultation  Gastrointestinal: Soft, non tender  Genitourinary: no suprapubic tenderness  Musculoskeletal: No edema  Skin: warm, dry  Neuro: Alert.  Psych: Mood appropriate.         Labs and Imaging   Nuclear stress test with myocardial perfusion    Result Date: 1/17/2025    Perfusion Conclusion: TID ratio is 1.05.   Stress Test: A pharmacological stress test was performed using regadenoson (Lexiscan). The patient reported no symptoms during the stress test.   No angina or ischemic EKG changes were seen   Cardiolite study demonstrates an area of mildly reduced tracer uptake of the apex of the left ventricle with improvement of the resting images the rest of the myocardium shows normal tracer uptake ejection fraction by gated study 73% normal global regional left ventricular systolic function   Cardiolite study demonstrates a possibility of mild apical ischemia versus artifact perfusion is normal ejection fraction is normal     XR CHEST PORTABLE    Result Date: 1/16/2025  XR CHEST PORTABLE 1/16/2025 5:54 DEMOGRAPHICS: 64 years old Female COMPARISON: 1/14/2025 and CT 1/13/2025 REASON FOR EXAM: Hypoxia and congestion IMPRESSION: 1. Shallow lung inflation and diffuse hazy and fine reticular opacity consistent  with pulmonary fibrosis. Asymmetric opacity in the periphery of the right mid lung and lung base is similar to the prior and probably related to the effusion seen on CT 1/13/2025, not significantly changed. 2. No evidence of pneumothorax. 3. Stable appearing cardiomediastinal silhouette.  Dictated and Electronically Signed By: Jere Gaitan MD 1/16/2025 5:11        IR GUIDED THORACENTESIS PLEURAL    Result Date: 1/14/2025  PROCEDURE:  IR GUIDED THORACENTESIS PLEURAL DATE:  1/14/2025 8:34 AM HISTORY: right pleural effusion  Acute respiratory failure with hypoxia / Reason for exam:->right pleural effusion PROVIDER: Brittany Rader MD COMPARISON: CTA of the

## 2025-01-18 NOTE — PROGRESS NOTES
Pulmonary and Critical Care  Progress Note      VITALS:  BP 97/71   Pulse 71   Temp 98.2 °F (36.8 °C) (Oral)   Resp 22   Ht 1.575 m (5' 2\")   Wt 75.2 kg (165 lb 12.6 oz)   SpO2 93%   BMI 30.32 kg/m²     Subjective:   CHIEF COMPLAINT :SOB     HPI:                The patient is a 64 y.o. female is sitting in the bed. She is in mild resp distress    Objective:   PHYSICAL EXAM:    LUNGS:Basal coarse crackles  Abd-soft, BS+,NT  Ext- 1 + pedal edema  CVS-s1s2, no murmurs      DATA:    CBC:  Recent Labs     01/16/25 0818 01/17/25 0443   WBC 10.7* 8.2   RBC 5.64* 5.35   HGB 15.7 15.0   HCT 50.6* 48.2*    196   MCV 89.7 90.1   MCH 27.8 28.0   MCHC 31.0* 31.1*   RDW 16.5* 16.2*      BMP:  Recent Labs     01/16/25 2059 01/17/25 0443 01/17/25  2212   * 135* 135*   K 4.0 3.6 3.8   CL 89* 90* 91*   CO2 36* 33* 34*   BUN 14 14 18   CREATININE 0.9 0.8 0.9   CALCIUM 9.3 9.4 9.3   GLUCOSE 103* 95 110*      ABG:  No results for input(s): \"PH\", \"PO2ART\", \"STP9VQK\", \"HCO3\", \"BEART\", \"O2SAT\" in the last 72 hours.  BNP  No results found for: \"BNP\"   D-Dimer:  No results found for: \"DDIMER\"   Radiology: None    Assessment/Plan     Patient Active Problem List    Diagnosis Date Noted    Restless legs syndrome 01/11/2018     Priority: Medium    Acute on chronic respiratory failure with hypoxia 01/13/2025    Chronic obstructive pulmonary disease (HCC) 07/10/2024    Hypoxemia 07/10/2024    ILD (interstitial lung disease) ([facility]) 07/10/2024    Acute respiratory failure with hypoxia 06/28/2024    Pure hypercholesterolemia 01/02/2019    Osteopenia of lumbar spine 01/02/2019    Marijuana use 01/02/2019    Bilateral hip pain 05/14/2018    Allergic rhinitis 11/17/2017    Ulnar neuritis 09/19/2017    Back muscle spasm 05/04/2017    Osteoarthritis 07/08/2016    Essential hypertension 07/08/2016    Major depressive disorder, recurrent episode (HCC) 05/24/2016     Overview Note:     Replacing Inactive Diagnoses      Tobacco abuse

## 2025-02-07 ENCOUNTER — OFFICE VISIT (OUTPATIENT)
Dept: PULMONOLOGY | Age: 65
End: 2025-02-07
Payer: COMMERCIAL

## 2025-02-07 VITALS — BODY MASS INDEX: 27.07 KG/M2 | WEIGHT: 148 LBS | SYSTOLIC BLOOD PRESSURE: 126 MMHG | DIASTOLIC BLOOD PRESSURE: 74 MMHG

## 2025-02-07 DIAGNOSIS — Z87.891 EX-CIGARETTE SMOKER: ICD-10-CM

## 2025-02-07 DIAGNOSIS — J84.9 ILD (INTERSTITIAL LUNG DISEASE) (HCC): Primary | ICD-10-CM

## 2025-02-07 DIAGNOSIS — J96.11 CHRONIC HYPOXIC RESPIRATORY FAILURE: ICD-10-CM

## 2025-02-07 DIAGNOSIS — J43.2 CENTRILOBULAR EMPHYSEMA (HCC): ICD-10-CM

## 2025-02-07 PROCEDURE — 99215 OFFICE O/P EST HI 40 MIN: CPT | Performed by: INTERNAL MEDICINE

## 2025-02-07 PROCEDURE — 3078F DIAST BP <80 MM HG: CPT | Performed by: INTERNAL MEDICINE

## 2025-02-07 PROCEDURE — 3074F SYST BP LT 130 MM HG: CPT | Performed by: INTERNAL MEDICINE

## 2025-02-07 RX ORDER — NINTEDANIB 150 MG/1
150 CAPSULE ORAL 2 TIMES DAILY
Qty: 60 CAPSULE | Refills: 4 | Status: SHIPPED | OUTPATIENT
Start: 2025-02-07 | End: 2025-03-09

## 2025-02-07 RX ORDER — PREDNISONE 10 MG/1
10 TABLET ORAL DAILY
Qty: 90 TABLET | Refills: 3 | Status: SHIPPED | OUTPATIENT
Start: 2025-02-18 | End: 2026-02-13

## 2025-02-07 RX ORDER — FLUTICASONE FUROATE, UMECLIDINIUM BROMIDE AND VILANTEROL TRIFENATATE 100; 62.5; 25 UG/1; UG/1; UG/1
1 POWDER RESPIRATORY (INHALATION) DAILY
Qty: 1 EACH | Refills: 5 | Status: SHIPPED | OUTPATIENT
Start: 2025-02-07

## 2025-02-07 RX ORDER — ALPRAZOLAM 0.5 MG
0.5 TABLET ORAL 2 TIMES DAILY PRN
COMMUNITY
Start: 2025-01-30 | End: 2025-02-14

## 2025-02-07 RX ORDER — CARISOPRODOL 350 MG/1
350 TABLET ORAL EVERY 8 HOURS PRN
COMMUNITY
Start: 2024-12-02

## 2025-02-07 RX ORDER — PREDNISONE 10 MG/1
TABLET ORAL
Qty: 20 TABLET | Refills: 0 | Status: SHIPPED | OUTPATIENT
Start: 2025-02-07

## 2025-02-07 ASSESSMENT — ENCOUNTER SYMPTOMS
SHORTNESS OF BREATH: 1
BACK PAIN: 0
EYE DISCHARGE: 0
COUGH: 1
EYE ITCHING: 0
ABDOMINAL DISTENTION: 0
ABDOMINAL PAIN: 0

## 2025-02-07 NOTE — ASSESSMENT & PLAN NOTE
She has CPFE  Not sure if she has RA associated ILD  I'll do the CTD work up  Start OFEV  PFT in 6 months  LFT's Q 1 month x 3 months and then Q 3 months till the time patient is on OFEV  Get yearly flu vaccine  Lung transplant evaluation at [facility]  Home O2 eval

## 2025-02-07 NOTE — PROGRESS NOTES
Alondra Ho  1960  Referring Provider[facility]     Subjective:     Chief Complaint   Patient presents with    Follow-Up from Hospital     Patient was placed on portable o2 3 LPM       HPI  Alondra is a 64 y.o. female has come back as a follow up. She was seen in the hospital for the severe Pulmonary HTN. She has combined Pulmonary Fibrosis with Emphysema. She is on 3 L.min of oxygen. She is not working. She has a dog, but no birds. She had no flu vaccine last year. She has little cough, little pglegm-clear, no hemoptysis, no loss of weight, good appetite, SOBOE off and on. She smoked 1 to 1.5 pk/day x 41 yrs quit 1.5 yrs ago.She is only Albuterol prn. She is not alcoholic. She has no liver disease. She arthritis and as per patient she was diagnosed as OA.    Her PFT done on 12/03/24 showed FEV1 of 1.63 litres (72%), ratio 79.26, TLC 60% and DLCO is 20%.            Current Outpatient Medications   Medication Sig Dispense Refill    ALPRAZolam (XANAX) 0.5 MG tablet Take 1 tablet by mouth 2 times daily as needed.      carisoprodol (SOMA) 350 MG tablet Take 1 tablet by mouth every 8 hours as needed.      Nintedanib Esylate (OFEV) 150 MG CAPS Take 150 mg by mouth in the morning and at bedtime 60 capsule 4    predniSONE (DELTASONE) 10 MG tablet Take 4 tabs 2 days, 3 tabs x 2 days, 2 tabs x 2 days, 1 tab x 2 days 20 tablet 0    fluticasone-umeclidin-vilant (TRELEGY ELLIPTA) 100-62.5-25 MCG/ACT AEPB inhaler Inhale 1 puff into the lungs daily 1 each 5    [START ON 2/18/2025] predniSONE (DELTASONE) 10 MG tablet Take 1 tablet by mouth daily 90 tablet 3    furosemide (LASIX) 40 MG tablet Take 1 tablet by mouth daily 60 tablet 3    QUEtiapine (SEROQUEL) 50 MG tablet Take 1 tablet by mouth nightly 60 tablet 3    aspirin 81 MG chewable tablet Take 1 tablet by mouth daily 30 tablet 3    carvedilol (COREG) 6.25 MG tablet Take 0.5 tablets by mouth 2 times daily (with meals) 60 tablet 3    empagliflozin

## 2025-08-19 ENCOUNTER — HOSPITAL ENCOUNTER (OUTPATIENT)
Dept: CT IMAGING | Age: 65
Discharge: HOME | End: 2025-08-19
Attending: INTERNAL MEDICINE
Payer: COMMERCIAL

## 2025-08-19 ENCOUNTER — HOSPITAL ENCOUNTER (OUTPATIENT)
Dept: CARDIAC REHAB | Age: 65
Discharge: HOME | End: 2025-08-19
Attending: INTERNAL MEDICINE
Payer: COMMERCIAL

## 2025-08-19 DIAGNOSIS — J84.9 ILD (INTERSTITIAL LUNG DISEASE) (HCC): ICD-10-CM

## 2025-08-19 PROCEDURE — 71250 CT THORAX DX C-: CPT

## 2025-08-19 PROCEDURE — 94060 EVALUATION OF WHEEZING: CPT

## 2025-08-19 PROCEDURE — 94640 AIRWAY INHALATION TREATMENT: CPT

## 2025-08-19 PROCEDURE — 94760 N-INVAS EAR/PLS OXIMETRY 1: CPT

## 2025-08-19 PROCEDURE — 94727 GAS DIL/WSHOT DETER LNG VOL: CPT

## 2025-08-19 PROCEDURE — 94729 DIFFUSING CAPACITY: CPT

## 2025-09-02 ENCOUNTER — OFFICE VISIT (OUTPATIENT)
Dept: PULMONOLOGY | Age: 65
End: 2025-09-02
Payer: COMMERCIAL

## 2025-09-02 VITALS
OXYGEN SATURATION: 88 % | SYSTOLIC BLOOD PRESSURE: 116 MMHG | DIASTOLIC BLOOD PRESSURE: 70 MMHG | WEIGHT: 152.7 LBS | BODY MASS INDEX: 28.1 KG/M2 | HEIGHT: 62 IN | HEART RATE: 73 BPM

## 2025-09-02 DIAGNOSIS — J43.2 CENTRILOBULAR EMPHYSEMA (HCC): Primary | ICD-10-CM

## 2025-09-02 DIAGNOSIS — Z87.891 EX-CIGARETTE SMOKER: ICD-10-CM

## 2025-09-02 DIAGNOSIS — J84.9 ILD (INTERSTITIAL LUNG DISEASE) (HCC): ICD-10-CM

## 2025-09-02 DIAGNOSIS — R09.02 HYPOXEMIA: ICD-10-CM

## 2025-09-02 PROCEDURE — 3078F DIAST BP <80 MM HG: CPT | Performed by: INTERNAL MEDICINE

## 2025-09-02 PROCEDURE — 3074F SYST BP LT 130 MM HG: CPT | Performed by: INTERNAL MEDICINE

## 2025-09-02 PROCEDURE — 99215 OFFICE O/P EST HI 40 MIN: CPT | Performed by: INTERNAL MEDICINE

## 2025-09-02 RX ORDER — NINTEDANIB 150 MG/1
CAPSULE ORAL
Qty: 60 CAPSULE | Refills: 3 | Status: SHIPPED | OUTPATIENT
Start: 2025-09-02

## 2025-09-02 ASSESSMENT — ENCOUNTER SYMPTOMS
COUGH: 1
ABDOMINAL DISTENTION: 0
BACK PAIN: 0
SHORTNESS OF BREATH: 1
EYE ITCHING: 0
EYE DISCHARGE: 0
ABDOMINAL PAIN: 0

## 2025-09-03 ENCOUNTER — TELEPHONE (OUTPATIENT)
Dept: PULMONOLOGY | Age: 65
End: 2025-09-03

## 2025-09-03 DIAGNOSIS — J44.9 CHRONIC OBSTRUCTIVE PULMONARY DISEASE, UNSPECIFIED COPD TYPE (HCC): Primary | ICD-10-CM

## 2025-09-03 RX ORDER — FLUTICASONE FUROATE, UMECLIDINIUM BROMIDE AND VILANTEROL TRIFENATATE 100; 62.5; 25 UG/1; UG/1; UG/1
1 POWDER RESPIRATORY (INHALATION) DAILY
Qty: 1 EACH | Refills: 5 | Status: SHIPPED | OUTPATIENT
Start: 2025-09-03